# Patient Record
Sex: MALE | Race: ASIAN | NOT HISPANIC OR LATINO | Employment: FULL TIME | URBAN - METROPOLITAN AREA
[De-identification: names, ages, dates, MRNs, and addresses within clinical notes are randomized per-mention and may not be internally consistent; named-entity substitution may affect disease eponyms.]

---

## 2017-01-12 ENCOUNTER — COMMUNICATION - HEALTHEAST (OUTPATIENT)
Dept: FAMILY MEDICINE | Facility: CLINIC | Age: 42
End: 2017-01-12

## 2017-01-12 DIAGNOSIS — E11.9 DIABETES MELLITUS (H): ICD-10-CM

## 2017-03-14 ENCOUNTER — OFFICE VISIT - HEALTHEAST (OUTPATIENT)
Dept: FAMILY MEDICINE | Facility: CLINIC | Age: 42
End: 2017-03-14

## 2017-03-14 DIAGNOSIS — R05.9 COUGH: ICD-10-CM

## 2017-03-14 DIAGNOSIS — J98.01 BRONCHOSPASM: ICD-10-CM

## 2017-03-14 DIAGNOSIS — J40 BRONCHITIS: ICD-10-CM

## 2017-03-14 ASSESSMENT — MIFFLIN-ST. JEOR: SCORE: 1826.13

## 2017-04-06 ENCOUNTER — COMMUNICATION - HEALTHEAST (OUTPATIENT)
Dept: FAMILY MEDICINE | Facility: CLINIC | Age: 42
End: 2017-04-06

## 2017-04-06 DIAGNOSIS — E11.9 DIABETES (H): ICD-10-CM

## 2017-05-17 ENCOUNTER — OFFICE VISIT - HEALTHEAST (OUTPATIENT)
Dept: FAMILY MEDICINE | Facility: CLINIC | Age: 42
End: 2017-05-17

## 2017-05-17 DIAGNOSIS — K11.20 PAROTITIS: ICD-10-CM

## 2017-05-17 ASSESSMENT — MIFFLIN-ST. JEOR: SCORE: 1830.66

## 2017-10-03 ENCOUNTER — OFFICE VISIT - HEALTHEAST (OUTPATIENT)
Dept: FAMILY MEDICINE | Facility: CLINIC | Age: 42
End: 2017-10-03

## 2017-10-03 DIAGNOSIS — L30.9 DERMATITIS: ICD-10-CM

## 2017-10-03 DIAGNOSIS — E11.9 DIABETES (H): ICD-10-CM

## 2017-10-03 DIAGNOSIS — Z23 NEED FOR IMMUNIZATION AGAINST INFLUENZA: ICD-10-CM

## 2017-10-03 LAB
CHOLEST SERPL-MCNC: 154 MG/DL
FASTING STATUS PATIENT QL REPORTED: NO
HBA1C MFR BLD: 9.2 % (ref 3.5–6)
HDLC SERPL-MCNC: 32 MG/DL
LDLC SERPL CALC-MCNC: 88 MG/DL
TRIGL SERPL-MCNC: 172 MG/DL

## 2017-10-03 ASSESSMENT — MIFFLIN-ST. JEOR: SCORE: 1835.2

## 2017-10-04 ENCOUNTER — COMMUNICATION - HEALTHEAST (OUTPATIENT)
Dept: FAMILY MEDICINE | Facility: CLINIC | Age: 42
End: 2017-10-04

## 2017-10-08 ENCOUNTER — COMMUNICATION - HEALTHEAST (OUTPATIENT)
Dept: FAMILY MEDICINE | Facility: CLINIC | Age: 42
End: 2017-10-08

## 2017-10-08 DIAGNOSIS — E11.9 DIABETES (H): ICD-10-CM

## 2017-10-13 ENCOUNTER — RECORDS - HEALTHEAST (OUTPATIENT)
Dept: ADMINISTRATIVE | Facility: OTHER | Age: 42
End: 2017-10-13

## 2017-11-10 ENCOUNTER — RECORDS - HEALTHEAST (OUTPATIENT)
Dept: ADMINISTRATIVE | Facility: OTHER | Age: 42
End: 2017-11-10

## 2017-12-08 ENCOUNTER — RECORDS - HEALTHEAST (OUTPATIENT)
Dept: ADMINISTRATIVE | Facility: OTHER | Age: 42
End: 2017-12-08

## 2017-12-28 ENCOUNTER — COMMUNICATION - HEALTHEAST (OUTPATIENT)
Dept: FAMILY MEDICINE | Facility: CLINIC | Age: 42
End: 2017-12-28

## 2017-12-28 ENCOUNTER — OFFICE VISIT - HEALTHEAST (OUTPATIENT)
Dept: FAMILY MEDICINE | Facility: CLINIC | Age: 42
End: 2017-12-28

## 2017-12-28 DIAGNOSIS — R22.0 JAW SWELLING: ICD-10-CM

## 2017-12-28 DIAGNOSIS — E11.9 DIABETES MELLITUS (H): ICD-10-CM

## 2017-12-28 DIAGNOSIS — K11.20 PAROTIDITIS: ICD-10-CM

## 2017-12-28 DIAGNOSIS — E11.9 DIABETES (H): ICD-10-CM

## 2017-12-28 ASSESSMENT — MIFFLIN-ST. JEOR: SCORE: 1862.42

## 2018-02-05 ENCOUNTER — OFFICE VISIT - HEALTHEAST (OUTPATIENT)
Dept: FAMILY MEDICINE | Facility: CLINIC | Age: 43
End: 2018-02-05

## 2018-02-05 DIAGNOSIS — E11.9 DIABETES (H): ICD-10-CM

## 2018-02-05 LAB — HBA1C MFR BLD: 9.6 % (ref 3.5–6)

## 2018-02-05 ASSESSMENT — MIFFLIN-ST. JEOR: SCORE: 1835.2

## 2018-02-21 ENCOUNTER — COMMUNICATION - HEALTHEAST (OUTPATIENT)
Dept: FAMILY MEDICINE | Facility: CLINIC | Age: 43
End: 2018-02-21

## 2018-03-21 ENCOUNTER — OFFICE VISIT - HEALTHEAST (OUTPATIENT)
Dept: NURSING | Facility: CLINIC | Age: 43
End: 2018-03-21

## 2018-03-21 DIAGNOSIS — E11.9 TYPE 2 DIABETES MELLITUS WITHOUT COMPLICATION, WITHOUT LONG-TERM CURRENT USE OF INSULIN (H): ICD-10-CM

## 2018-03-23 ENCOUNTER — COMMUNICATION - HEALTHEAST (OUTPATIENT)
Dept: NURSING | Facility: CLINIC | Age: 43
End: 2018-03-23

## 2018-03-23 DIAGNOSIS — E11.9 TYPE 2 DIABETES MELLITUS WITHOUT COMPLICATION, WITHOUT LONG-TERM CURRENT USE OF INSULIN (H): ICD-10-CM

## 2018-04-02 ENCOUNTER — COMMUNICATION - HEALTHEAST (OUTPATIENT)
Dept: FAMILY MEDICINE | Facility: CLINIC | Age: 43
End: 2018-04-02

## 2018-04-02 DIAGNOSIS — E11.9 DIABETES MELLITUS (H): ICD-10-CM

## 2018-05-17 ENCOUNTER — COMMUNICATION - HEALTHEAST (OUTPATIENT)
Dept: NURSING | Facility: CLINIC | Age: 43
End: 2018-05-17

## 2018-05-17 DIAGNOSIS — E11.9 TYPE 2 DIABETES MELLITUS WITHOUT COMPLICATION, WITHOUT LONG-TERM CURRENT USE OF INSULIN (H): ICD-10-CM

## 2018-05-18 ENCOUNTER — COMMUNICATION - HEALTHEAST (OUTPATIENT)
Dept: NURSING | Facility: CLINIC | Age: 43
End: 2018-05-18

## 2018-05-18 DIAGNOSIS — E11.9 TYPE 2 DIABETES MELLITUS WITHOUT COMPLICATION, WITHOUT LONG-TERM CURRENT USE OF INSULIN (H): ICD-10-CM

## 2018-05-21 ENCOUNTER — OFFICE VISIT - HEALTHEAST (OUTPATIENT)
Dept: PHARMACY | Facility: CLINIC | Age: 43
End: 2018-05-21

## 2018-05-21 DIAGNOSIS — E11.9 TYPE 2 DIABETES MELLITUS WITHOUT COMPLICATION, WITHOUT LONG-TERM CURRENT USE OF INSULIN (H): ICD-10-CM

## 2018-06-11 ENCOUNTER — OFFICE VISIT - HEALTHEAST (OUTPATIENT)
Dept: PHARMACY | Facility: CLINIC | Age: 43
End: 2018-06-11

## 2018-06-11 DIAGNOSIS — E11.9 TYPE 2 DIABETES MELLITUS WITHOUT COMPLICATION, WITHOUT LONG-TERM CURRENT USE OF INSULIN (H): ICD-10-CM

## 2018-06-16 ENCOUNTER — COMMUNICATION - HEALTHEAST (OUTPATIENT)
Dept: NURSING | Facility: CLINIC | Age: 43
End: 2018-06-16

## 2018-06-16 DIAGNOSIS — E11.9 TYPE 2 DIABETES MELLITUS WITHOUT COMPLICATION, WITHOUT LONG-TERM CURRENT USE OF INSULIN (H): ICD-10-CM

## 2018-07-15 ENCOUNTER — COMMUNICATION - HEALTHEAST (OUTPATIENT)
Dept: NURSING | Facility: CLINIC | Age: 43
End: 2018-07-15

## 2018-07-15 DIAGNOSIS — E11.9 TYPE 2 DIABETES MELLITUS WITHOUT COMPLICATION, WITHOUT LONG-TERM CURRENT USE OF INSULIN (H): ICD-10-CM

## 2018-07-23 ENCOUNTER — OFFICE VISIT - HEALTHEAST (OUTPATIENT)
Dept: PHARMACY | Facility: CLINIC | Age: 43
End: 2018-07-23

## 2018-07-23 DIAGNOSIS — E78.5 DYSLIPIDEMIA: ICD-10-CM

## 2018-07-23 DIAGNOSIS — E11.9 TYPE 2 DIABETES MELLITUS WITHOUT COMPLICATION, WITHOUT LONG-TERM CURRENT USE OF INSULIN (H): ICD-10-CM

## 2018-07-31 ENCOUNTER — RECORDS - HEALTHEAST (OUTPATIENT)
Dept: ADMINISTRATIVE | Facility: OTHER | Age: 43
End: 2018-07-31

## 2018-08-17 ENCOUNTER — COMMUNICATION - HEALTHEAST (OUTPATIENT)
Dept: NURSING | Facility: CLINIC | Age: 43
End: 2018-08-17

## 2018-08-17 DIAGNOSIS — E11.9 TYPE 2 DIABETES MELLITUS WITHOUT COMPLICATION, WITHOUT LONG-TERM CURRENT USE OF INSULIN (H): ICD-10-CM

## 2018-08-27 ENCOUNTER — OFFICE VISIT - HEALTHEAST (OUTPATIENT)
Dept: FAMILY MEDICINE | Facility: CLINIC | Age: 43
End: 2018-08-27

## 2018-08-27 DIAGNOSIS — L30.9 DERMATITIS: ICD-10-CM

## 2018-08-27 DIAGNOSIS — K11.20 PAROTIDITIS: ICD-10-CM

## 2018-08-27 DIAGNOSIS — E11.9 TYPE 2 DIABETES MELLITUS WITHOUT COMPLICATION, WITHOUT LONG-TERM CURRENT USE OF INSULIN (H): ICD-10-CM

## 2018-08-27 ASSESSMENT — MIFFLIN-ST. JEOR: SCORE: 1871.49

## 2018-09-16 ENCOUNTER — COMMUNICATION - HEALTHEAST (OUTPATIENT)
Dept: NURSING | Facility: CLINIC | Age: 43
End: 2018-09-16

## 2018-09-16 DIAGNOSIS — E11.9 TYPE 2 DIABETES MELLITUS WITHOUT COMPLICATION, WITHOUT LONG-TERM CURRENT USE OF INSULIN (H): ICD-10-CM

## 2018-10-16 ENCOUNTER — COMMUNICATION - HEALTHEAST (OUTPATIENT)
Dept: NURSING | Facility: CLINIC | Age: 43
End: 2018-10-16

## 2018-10-16 DIAGNOSIS — E11.9 TYPE 2 DIABETES MELLITUS WITHOUT COMPLICATION, WITHOUT LONG-TERM CURRENT USE OF INSULIN (H): ICD-10-CM

## 2018-10-26 ENCOUNTER — COMMUNICATION - HEALTHEAST (OUTPATIENT)
Dept: PHARMACY | Facility: CLINIC | Age: 43
End: 2018-10-26

## 2018-10-26 ENCOUNTER — OFFICE VISIT - HEALTHEAST (OUTPATIENT)
Dept: PHARMACY | Facility: CLINIC | Age: 43
End: 2018-10-26

## 2018-10-26 DIAGNOSIS — E78.5 DYSLIPIDEMIA: ICD-10-CM

## 2018-10-26 DIAGNOSIS — E11.8 TYPE 2 DIABETES MELLITUS WITH COMPLICATION, WITHOUT LONG-TERM CURRENT USE OF INSULIN (H): ICD-10-CM

## 2018-10-26 LAB — HBA1C MFR BLD: 6.6 % (ref 3.5–6)

## 2018-11-18 ENCOUNTER — COMMUNICATION - HEALTHEAST (OUTPATIENT)
Dept: NURSING | Facility: CLINIC | Age: 43
End: 2018-11-18

## 2018-11-18 DIAGNOSIS — E11.9 TYPE 2 DIABETES MELLITUS WITHOUT COMPLICATION, WITHOUT LONG-TERM CURRENT USE OF INSULIN (H): ICD-10-CM

## 2018-12-21 ENCOUNTER — COMMUNICATION - HEALTHEAST (OUTPATIENT)
Dept: NURSING | Facility: CLINIC | Age: 43
End: 2018-12-21

## 2018-12-21 DIAGNOSIS — E11.9 TYPE 2 DIABETES MELLITUS WITHOUT COMPLICATION, WITHOUT LONG-TERM CURRENT USE OF INSULIN (H): ICD-10-CM

## 2019-01-08 ENCOUNTER — COMMUNICATION - HEALTHEAST (OUTPATIENT)
Dept: NURSING | Facility: CLINIC | Age: 44
End: 2019-01-08

## 2019-01-08 DIAGNOSIS — E11.9 TYPE 2 DIABETES MELLITUS WITHOUT COMPLICATION, WITHOUT LONG-TERM CURRENT USE OF INSULIN (H): ICD-10-CM

## 2019-01-16 ENCOUNTER — OFFICE VISIT - HEALTHEAST (OUTPATIENT)
Dept: PHARMACY | Facility: CLINIC | Age: 44
End: 2019-01-16

## 2019-01-16 ENCOUNTER — AMBULATORY - HEALTHEAST (OUTPATIENT)
Dept: LAB | Facility: CLINIC | Age: 44
End: 2019-01-16

## 2019-01-16 DIAGNOSIS — R07.0 THROAT PAIN: ICD-10-CM

## 2019-01-16 DIAGNOSIS — E11.9 TYPE 2 DIABETES MELLITUS WITHOUT COMPLICATION, WITHOUT LONG-TERM CURRENT USE OF INSULIN (H): ICD-10-CM

## 2019-01-16 DIAGNOSIS — E78.5 DYSLIPIDEMIA: ICD-10-CM

## 2019-01-16 LAB — HBA1C MFR BLD: 7.6 % (ref 3.5–6)

## 2019-01-31 ENCOUNTER — COMMUNICATION - HEALTHEAST (OUTPATIENT)
Dept: PHARMACY | Facility: CLINIC | Age: 44
End: 2019-01-31

## 2019-01-31 DIAGNOSIS — E78.5 DYSLIPIDEMIA: ICD-10-CM

## 2019-01-31 DIAGNOSIS — E11.9 TYPE 2 DIABETES MELLITUS WITHOUT COMPLICATION, WITHOUT LONG-TERM CURRENT USE OF INSULIN (H): ICD-10-CM

## 2019-02-21 ENCOUNTER — RECORDS - HEALTHEAST (OUTPATIENT)
Dept: ADMINISTRATIVE | Facility: OTHER | Age: 44
End: 2019-02-21

## 2019-03-07 ENCOUNTER — COMMUNICATION - HEALTHEAST (OUTPATIENT)
Dept: PHARMACY | Facility: CLINIC | Age: 44
End: 2019-03-07

## 2019-03-07 ENCOUNTER — OFFICE VISIT - HEALTHEAST (OUTPATIENT)
Dept: PHARMACY | Facility: CLINIC | Age: 44
End: 2019-03-07

## 2019-03-07 ENCOUNTER — AMBULATORY - HEALTHEAST (OUTPATIENT)
Dept: LAB | Facility: CLINIC | Age: 44
End: 2019-03-07

## 2019-03-07 DIAGNOSIS — E78.5 DYSLIPIDEMIA: ICD-10-CM

## 2019-03-07 DIAGNOSIS — E11.9 TYPE 2 DIABETES MELLITUS WITHOUT COMPLICATION, WITHOUT LONG-TERM CURRENT USE OF INSULIN (H): ICD-10-CM

## 2019-03-07 LAB
ANION GAP SERPL CALCULATED.3IONS-SCNC: 9 MMOL/L (ref 5–18)
BUN SERPL-MCNC: 9 MG/DL (ref 8–22)
CALCIUM SERPL-MCNC: 9.3 MG/DL (ref 8.5–10.5)
CHLORIDE BLD-SCNC: 105 MMOL/L (ref 98–107)
CHOLEST SERPL-MCNC: 93 MG/DL
CO2 SERPL-SCNC: 28 MMOL/L (ref 22–31)
CREAT SERPL-MCNC: 0.91 MG/DL (ref 0.7–1.3)
CREAT UR-MCNC: 115.7 MG/DL
FASTING STATUS PATIENT QL REPORTED: YES
GFR SERPL CREATININE-BSD FRML MDRD: >60 ML/MIN/1.73M2
GLUCOSE BLD-MCNC: 146 MG/DL (ref 70–125)
HDLC SERPL-MCNC: 27 MG/DL
LDLC SERPL CALC-MCNC: 42 MG/DL
MICROALBUMIN UR-MCNC: 4.89 MG/DL (ref 0–1.99)
MICROALBUMIN/CREAT UR: 42.3 MG/G
POTASSIUM BLD-SCNC: 3.9 MMOL/L (ref 3.5–5)
SODIUM SERPL-SCNC: 142 MMOL/L (ref 136–145)
TRIGL SERPL-MCNC: 122 MG/DL

## 2019-03-14 ENCOUNTER — OFFICE VISIT - HEALTHEAST (OUTPATIENT)
Dept: FAMILY MEDICINE | Facility: CLINIC | Age: 44
End: 2019-03-14

## 2019-03-14 DIAGNOSIS — J45.901 MODERATE ASTHMA WITH EXACERBATION, UNSPECIFIED WHETHER PERSISTENT: ICD-10-CM

## 2019-03-21 ENCOUNTER — COMMUNICATION - HEALTHEAST (OUTPATIENT)
Dept: FAMILY MEDICINE | Facility: CLINIC | Age: 44
End: 2019-03-21

## 2019-03-21 ENCOUNTER — OFFICE VISIT - HEALTHEAST (OUTPATIENT)
Dept: FAMILY MEDICINE | Facility: CLINIC | Age: 44
End: 2019-03-21

## 2019-03-21 DIAGNOSIS — R09.82 POST-NASAL DRIP: ICD-10-CM

## 2019-03-21 DIAGNOSIS — E11.9 TYPE 2 DIABETES MELLITUS WITHOUT COMPLICATION, WITHOUT LONG-TERM CURRENT USE OF INSULIN (H): ICD-10-CM

## 2019-03-21 DIAGNOSIS — R05.9 COUGH: ICD-10-CM

## 2019-03-21 ASSESSMENT — MIFFLIN-ST. JEOR: SCORE: 1871.49

## 2019-04-05 ENCOUNTER — COMMUNICATION - HEALTHEAST (OUTPATIENT)
Dept: FAMILY MEDICINE | Facility: CLINIC | Age: 44
End: 2019-04-05

## 2019-04-05 DIAGNOSIS — E11.9 DIABETES MELLITUS (H): ICD-10-CM

## 2019-04-15 ENCOUNTER — OFFICE VISIT - HEALTHEAST (OUTPATIENT)
Dept: FAMILY MEDICINE | Facility: CLINIC | Age: 44
End: 2019-04-15

## 2019-04-15 DIAGNOSIS — R03.0 ELEVATED BLOOD PRESSURE READING WITHOUT DIAGNOSIS OF HYPERTENSION: ICD-10-CM

## 2019-04-15 DIAGNOSIS — R05.9 COUGH: ICD-10-CM

## 2019-04-15 ASSESSMENT — MIFFLIN-ST. JEOR: SCORE: 1872.62

## 2019-05-13 ENCOUNTER — COMMUNICATION - HEALTHEAST (OUTPATIENT)
Dept: FAMILY MEDICINE | Facility: CLINIC | Age: 44
End: 2019-05-13

## 2019-05-13 ENCOUNTER — AMBULATORY - HEALTHEAST (OUTPATIENT)
Dept: FAMILY MEDICINE | Facility: CLINIC | Age: 44
End: 2019-05-13

## 2019-05-13 DIAGNOSIS — E11.9 DIABETES MELLITUS (H): ICD-10-CM

## 2019-06-03 ENCOUNTER — COMMUNICATION - HEALTHEAST (OUTPATIENT)
Dept: FAMILY MEDICINE | Facility: CLINIC | Age: 44
End: 2019-06-03

## 2019-06-03 DIAGNOSIS — E11.9 TYPE 2 DIABETES MELLITUS WITHOUT COMPLICATION, WITHOUT LONG-TERM CURRENT USE OF INSULIN (H): ICD-10-CM

## 2019-06-11 ENCOUNTER — AMBULATORY - HEALTHEAST (OUTPATIENT)
Dept: FAMILY MEDICINE | Facility: CLINIC | Age: 44
End: 2019-06-11

## 2019-06-11 ENCOUNTER — OFFICE VISIT - HEALTHEAST (OUTPATIENT)
Dept: FAMILY MEDICINE | Facility: CLINIC | Age: 44
End: 2019-06-11

## 2019-06-11 DIAGNOSIS — E11.9 TYPE 2 DIABETES MELLITUS WITHOUT COMPLICATION, WITHOUT LONG-TERM CURRENT USE OF INSULIN (H): ICD-10-CM

## 2019-06-11 DIAGNOSIS — L30.9 DERMATITIS: ICD-10-CM

## 2019-06-11 DIAGNOSIS — L28.0 LICHEN SIMPLEX CHRONICUS: ICD-10-CM

## 2019-06-11 LAB — HBA1C MFR BLD: 6.5 % (ref 3.5–6)

## 2019-06-11 ASSESSMENT — MIFFLIN-ST. JEOR: SCORE: 1848.64

## 2019-07-05 ENCOUNTER — COMMUNICATION - HEALTHEAST (OUTPATIENT)
Dept: FAMILY MEDICINE | Facility: CLINIC | Age: 44
End: 2019-07-05

## 2019-07-05 DIAGNOSIS — E11.9 TYPE 2 DIABETES MELLITUS WITHOUT COMPLICATION, WITHOUT LONG-TERM CURRENT USE OF INSULIN (H): ICD-10-CM

## 2019-07-08 ENCOUNTER — RECORDS - HEALTHEAST (OUTPATIENT)
Dept: HEALTH INFORMATION MANAGEMENT | Facility: CLINIC | Age: 44
End: 2019-07-08

## 2019-07-17 ENCOUNTER — COMMUNICATION - HEALTHEAST (OUTPATIENT)
Dept: FAMILY MEDICINE | Facility: CLINIC | Age: 44
End: 2019-07-17

## 2019-07-17 DIAGNOSIS — E78.5 DYSLIPIDEMIA: ICD-10-CM

## 2019-07-17 DIAGNOSIS — E11.9 TYPE 2 DIABETES MELLITUS WITHOUT COMPLICATION, WITHOUT LONG-TERM CURRENT USE OF INSULIN (H): ICD-10-CM

## 2019-07-19 ENCOUNTER — COMMUNICATION - HEALTHEAST (OUTPATIENT)
Dept: FAMILY MEDICINE | Facility: CLINIC | Age: 44
End: 2019-07-19

## 2019-07-19 DIAGNOSIS — E11.9 DIABETES MELLITUS (H): ICD-10-CM

## 2019-09-29 ENCOUNTER — COMMUNICATION - HEALTHEAST (OUTPATIENT)
Dept: FAMILY MEDICINE | Facility: CLINIC | Age: 44
End: 2019-09-29

## 2019-09-29 DIAGNOSIS — R09.82 POST-NASAL DRIP: ICD-10-CM

## 2019-09-29 DIAGNOSIS — R05.9 COUGH: ICD-10-CM

## 2019-09-29 DIAGNOSIS — J45.901 MODERATE ASTHMA WITH EXACERBATION, UNSPECIFIED WHETHER PERSISTENT: ICD-10-CM

## 2019-10-11 ENCOUNTER — OFFICE VISIT - HEALTHEAST (OUTPATIENT)
Dept: FAMILY MEDICINE | Facility: CLINIC | Age: 44
End: 2019-10-11

## 2019-10-11 DIAGNOSIS — L28.0 LICHEN SIMPLEX CHRONICUS: ICD-10-CM

## 2019-10-11 DIAGNOSIS — E11.9 TYPE 2 DIABETES MELLITUS WITHOUT COMPLICATION, WITHOUT LONG-TERM CURRENT USE OF INSULIN (H): ICD-10-CM

## 2019-10-11 LAB — HBA1C MFR BLD: 5.6 % (ref 3.5–6)

## 2019-10-11 ASSESSMENT — MIFFLIN-ST. JEOR: SCORE: 1854.9

## 2019-12-28 ENCOUNTER — COMMUNICATION - HEALTHEAST (OUTPATIENT)
Dept: PHARMACY | Facility: CLINIC | Age: 44
End: 2019-12-28

## 2019-12-28 DIAGNOSIS — E11.9 TYPE 2 DIABETES MELLITUS WITHOUT COMPLICATION, WITHOUT LONG-TERM CURRENT USE OF INSULIN (H): ICD-10-CM

## 2020-02-11 ENCOUNTER — OFFICE VISIT - HEALTHEAST (OUTPATIENT)
Dept: FAMILY MEDICINE | Facility: CLINIC | Age: 45
End: 2020-02-11

## 2020-02-11 DIAGNOSIS — E78.5 DYSLIPIDEMIA: ICD-10-CM

## 2020-02-11 DIAGNOSIS — E11.9 TYPE 2 DIABETES MELLITUS WITHOUT COMPLICATION, WITHOUT LONG-TERM CURRENT USE OF INSULIN (H): ICD-10-CM

## 2020-02-11 LAB
ALBUMIN SERPL-MCNC: 4.3 G/DL (ref 3.5–5)
ALP SERPL-CCNC: 83 U/L (ref 45–120)
ALT SERPL W P-5'-P-CCNC: 49 U/L (ref 0–45)
ANION GAP SERPL CALCULATED.3IONS-SCNC: 8 MMOL/L (ref 5–18)
AST SERPL W P-5'-P-CCNC: 23 U/L (ref 0–40)
BILIRUB SERPL-MCNC: 0.4 MG/DL (ref 0–1)
BUN SERPL-MCNC: 17 MG/DL (ref 8–22)
CALCIUM SERPL-MCNC: 9.6 MG/DL (ref 8.5–10.5)
CHLORIDE BLD-SCNC: 105 MMOL/L (ref 98–107)
CHOLEST SERPL-MCNC: 170 MG/DL
CO2 SERPL-SCNC: 27 MMOL/L (ref 22–31)
CREAT SERPL-MCNC: 0.94 MG/DL (ref 0.7–1.3)
CREAT UR-MCNC: 107.2 MG/DL
FASTING STATUS PATIENT QL REPORTED: YES
GFR SERPL CREATININE-BSD FRML MDRD: >60 ML/MIN/1.73M2
GLUCOSE BLD-MCNC: 185 MG/DL (ref 70–125)
HBA1C MFR BLD: 6.7 % (ref 3.5–6)
HDLC SERPL-MCNC: 31 MG/DL
LDLC SERPL CALC-MCNC: 112 MG/DL
MICROALBUMIN UR-MCNC: 9.85 MG/DL (ref 0–1.99)
MICROALBUMIN/CREAT UR: 91.9 MG/G
POTASSIUM BLD-SCNC: 4.2 MMOL/L (ref 3.5–5)
PROT SERPL-MCNC: 7.6 G/DL (ref 6–8)
SODIUM SERPL-SCNC: 140 MMOL/L (ref 136–145)
TRIGL SERPL-MCNC: 134 MG/DL

## 2020-02-11 ASSESSMENT — MIFFLIN-ST. JEOR: SCORE: 1867.84

## 2020-02-15 ENCOUNTER — COMMUNICATION - HEALTHEAST (OUTPATIENT)
Dept: FAMILY MEDICINE | Facility: CLINIC | Age: 45
End: 2020-02-15

## 2020-02-26 ENCOUNTER — COMMUNICATION - HEALTHEAST (OUTPATIENT)
Dept: FAMILY MEDICINE | Facility: CLINIC | Age: 45
End: 2020-02-26

## 2020-02-26 DIAGNOSIS — E11.9 TYPE 2 DIABETES MELLITUS WITHOUT COMPLICATION, WITHOUT LONG-TERM CURRENT USE OF INSULIN (H): ICD-10-CM

## 2020-03-11 ENCOUNTER — COMMUNICATION - HEALTHEAST (OUTPATIENT)
Dept: FAMILY MEDICINE | Facility: CLINIC | Age: 45
End: 2020-03-11

## 2020-03-11 DIAGNOSIS — E11.9 TYPE 2 DIABETES MELLITUS WITHOUT COMPLICATION, WITHOUT LONG-TERM CURRENT USE OF INSULIN (H): ICD-10-CM

## 2020-03-13 ENCOUNTER — COMMUNICATION - HEALTHEAST (OUTPATIENT)
Dept: FAMILY MEDICINE | Facility: CLINIC | Age: 45
End: 2020-03-13

## 2020-03-17 ENCOUNTER — RECORDS - HEALTHEAST (OUTPATIENT)
Dept: FAMILY MEDICINE | Facility: CLINIC | Age: 45
End: 2020-03-17

## 2020-03-17 ENCOUNTER — COMMUNICATION - HEALTHEAST (OUTPATIENT)
Dept: FAMILY MEDICINE | Facility: CLINIC | Age: 45
End: 2020-03-17

## 2020-03-17 DIAGNOSIS — E11.9 TYPE 2 DIABETES MELLITUS WITHOUT COMPLICATION, WITHOUT LONG-TERM CURRENT USE OF INSULIN (H): ICD-10-CM

## 2020-03-18 ENCOUNTER — COMMUNICATION - HEALTHEAST (OUTPATIENT)
Dept: FAMILY MEDICINE | Facility: CLINIC | Age: 45
End: 2020-03-18

## 2020-03-23 ENCOUNTER — AMBULATORY - HEALTHEAST (OUTPATIENT)
Dept: FAMILY MEDICINE | Facility: CLINIC | Age: 45
End: 2020-03-23

## 2020-03-23 DIAGNOSIS — E11.9 TYPE 2 DIABETES MELLITUS WITHOUT COMPLICATION, WITHOUT LONG-TERM CURRENT USE OF INSULIN (H): ICD-10-CM

## 2020-05-15 ENCOUNTER — VIRTUAL VISIT (OUTPATIENT)
Dept: FAMILY MEDICINE | Facility: OTHER | Age: 45
End: 2020-05-15

## 2020-05-15 NOTE — PROGRESS NOTES
"Date: 05/15/2020 10:49:49  Clinician: Caroline Ospina  Clinician NPI: [id number]  Patient: Kaveh Peterson  Patient : 1975  [place] [address]  Patient Phone: [phone]  Visit Protocol: URI  Patient Summary:  Kaveh Miller is a 44 year old ( : 1975 ) male who initiated a Visit for COVID-19 (Coronavirus) evaluation and screening. When asked the question \"Please sign me up to receive news, health information and promotions from [employer].\", Kaveh Miller responded \"No\".    Kaveh Miller states his symptoms started gradually 3-4 days ago.   His symptoms consist of a cough, chills, malaise, myalgia, and a sore throat.   Symptom details     Cough: Kaveh Miller coughs a few times an hour and his cough is not more bothersome at night. Phlegm does not come into his throat when he coughs. He does not believe his cough is caused by post-nasal drip.     Sore throat: Kaveh Miller reports having mild throat pain (1-3 on a 10 point pain scale), does not have exudate on his tonsils, and can swallow liquids. The lymph nodes in his neck are not enlarged. A rash has not appeared on the skin since the sore throat started.      Kaveh Miller denies having nasal congestion, vomiting, nausea, teeth pain, ageusia, diarrhea, facial pain or pressure, ear pain, headache, rhinitis, anosmia, wheezing, enlarged lymph nodes, and fever. He also denies double sickening (worsening symptoms after initial improvement), taking antibiotic medication for the symptoms, and having recent facial or sinus surgery in the past 60 days. He is not experiencing dyspnea.   Precipitating events  Within the past week, Kaveh Miller has not been exposed to someone with strep throat. He has not recently been exposed to someone with influenza. Kaveh Miller has not been in close contact with any high risk individuals.   Pertinent COVID-19 (Coronavirus) information  In the past 14 days, Kaveh Miller has not worked " in a congregate living setting.   He does not work or volunteer as healthcare worker or a  and does not work or volunteer in a healthcare facility.   Kaveh Miller also has not lived in a congregate living setting in the past 14 days. He lives with a healthcare worker.   Kaveh Miller has not had a close contact with a laboratory-confirmed COVID-19 patient within 14 days of symptom onset.   Pertinent medical history  Kaveh Miller needs a return to work/school note.   Weight: 230 lbs   Kaveh Miller does not smoke or use smokeless tobacco.   Weight: 230 lbs    MEDICATIONS: atorvastatin oral, glipizide oral, metformin oral, ALLERGIES: NKDA  Clinician Response:  Dear Saw Angela,   Dear Saw Angela  Your symptoms show that you may have coronavirus (COVID-19). This illness can cause fever, cough and trouble breathing. Many people get a mild case and get better on their own. Some people can get very sick.  What should I do?  We would like to test you for this virus. This will be a curbside test done outside the clinic.  Please call [phone] to schedule your visit. Explain that you were referred by [facility] to have a COVID-19 test. Be ready to share your [facility] visit ID number.  Starting now:  Stay at least 6 feet away from others. (If someone will drive you to your test, stay in the backseat, as far away from the  as you can.)   Don't go to work, school or anywhere else. When it's time for your test, go straight to the testing site. Don't make any stops on the way there or back.   Wash your hands and face often. Use soap and water.   Cover your mouth and nose with a mask, tissue or washcloth.   Don't touch anyone. No hugging, kissing or handshakes.  While at home   Stay home and away from others (self-isolate) until:  You've had no fever---and no medicine that reduces fever---for 3 full days (72 hours). And...  Your other symptoms have gotten better. For example, your cough or breathing has  "improved. And...  At least 10 days have passed since your symptoms started.  During this time:  Stay in your own room (and use your own bathroom), if you can.  Don't go to work, school or anywhere else.  Stay away from others in your home. No hugging, kissing or shaking hands.  Don't let anyone visit.  Cover your mouth and nose with a mask, tissue or washcloth to avoid spreading germs.  Clean \"high touch\" surfaces often (doorknobs, counters, handles, etc.). Use a household cleaning spray or wipes.  Wash your hands and face often. Use soap and water.  How can I take care of myself?  1. Get lots of rest. Drink extra fluids (unless your doctor has told you not to).  2. Take Tylenol (acetaminophen) for fever or pain. If you have liver or kidney problems, ask your family doctor if it's okay to take Tylenol.  Adults can take either:   650 mg (two 325 mg pills) every 4 to 6 hours, or...  1,000 mg (two 500 mg pills) every 8 hours as needed.   Note: Don't take more than 3,000 mg in one day.   Acetaminophen is found in many medicines (both prescribed and over-the-counter medicines). Read all labels to be sure you don't take too much.   For children, check the Tylenol bottle for the right dose. The dose is based on the child's age or weight.  3. If you have other health problems (like cancer, heart failure, an organ transplant or severe kidney disease): Call your specialty clinic if you don't feel better in the next 2 days.  4. Know when to call 911: If your breathing is so bad that it keeps you from doing normal activities, call 911 or go to the emergency room. Tell them that you've been staying home and may have COVID-19.  5. Sign up for [employer]. We know it's scary to hear that you might have COVID-19. We want to track your symptoms to make sure you're okay over the next 2 weeks. Please look for an email from [employer]---this is a free, online program that we'll use to keep in touch. To sign up, follow the link in " the email. Learn more at [email or web address].  6. The following will serve as your written order for this Covid Test ordered by me for the indication of suspected Covid [Z20.828]: The test will be ordered in [facility], our electronic health record after you are scheduled and will show as ordered and authorized by Foster Caldwell MD   Order: Covid-19 (Coronavirus) PCR for SYMPTOMATIC testing from [facility]  Where can I get more information?  To learn more about COVID-19 and how to care for yourself at home, please visit the CDC website at https://www.cdc.gov/coronavirus/2019-ncov/about/steps-when-sick.html.  For more about your care at [facility], please visit [email or web address]  If you'd like to be part of a COVID-19 clinical trial (research study) at the [facility], go to [email or web address] for details.    Diagnosis: Acute upper respiratory infection, unspecified  Diagnosis ICD: J06.9

## 2020-05-18 ENCOUNTER — AMBULATORY - HEALTHEAST (OUTPATIENT)
Dept: FAMILY MEDICINE | Facility: CLINIC | Age: 45
End: 2020-05-18

## 2020-05-18 DIAGNOSIS — Z20.822 SUSPECTED COVID-19 VIRUS INFECTION: ICD-10-CM

## 2020-05-20 ENCOUNTER — AMBULATORY - HEALTHEAST (OUTPATIENT)
Dept: CARE COORDINATION | Facility: CLINIC | Age: 45
End: 2020-05-20

## 2020-05-20 ENCOUNTER — COMMUNICATION - HEALTHEAST (OUTPATIENT)
Dept: NURSING | Facility: CLINIC | Age: 45
End: 2020-05-20

## 2020-05-20 DIAGNOSIS — U07.1 COVID-19: ICD-10-CM

## 2020-05-20 DIAGNOSIS — E11.9 TYPE 2 DIABETES MELLITUS WITHOUT COMPLICATION, WITHOUT LONG-TERM CURRENT USE OF INSULIN (H): ICD-10-CM

## 2020-05-21 ENCOUNTER — COMMUNICATION - HEALTHEAST (OUTPATIENT)
Dept: CARE COORDINATION | Facility: CLINIC | Age: 45
End: 2020-05-21

## 2020-05-22 ENCOUNTER — OFFICE VISIT - HEALTHEAST (OUTPATIENT)
Dept: FAMILY MEDICINE | Facility: CLINIC | Age: 45
End: 2020-05-22

## 2020-05-22 DIAGNOSIS — U07.1 COVID-19: ICD-10-CM

## 2020-05-22 DIAGNOSIS — R05.9 COUGH: ICD-10-CM

## 2020-05-26 ENCOUNTER — COMMUNICATION - HEALTHEAST (OUTPATIENT)
Dept: NURSING | Facility: CLINIC | Age: 45
End: 2020-05-26

## 2020-05-26 ENCOUNTER — COMMUNICATION - HEALTHEAST (OUTPATIENT)
Dept: FAMILY MEDICINE | Facility: CLINIC | Age: 45
End: 2020-05-26

## 2020-05-26 DIAGNOSIS — E11.9 TYPE 2 DIABETES MELLITUS WITHOUT COMPLICATION, WITHOUT LONG-TERM CURRENT USE OF INSULIN (H): ICD-10-CM

## 2020-06-09 ENCOUNTER — RECORDS - HEALTHEAST (OUTPATIENT)
Dept: ADMINISTRATIVE | Facility: OTHER | Age: 45
End: 2020-06-09

## 2020-06-09 ENCOUNTER — TRANSFERRED RECORDS (OUTPATIENT)
Dept: HEALTH INFORMATION MANAGEMENT | Facility: CLINIC | Age: 45
End: 2020-06-09

## 2020-06-09 ENCOUNTER — COMMUNICATION - HEALTHEAST (OUTPATIENT)
Dept: NURSING | Facility: CLINIC | Age: 45
End: 2020-06-09

## 2020-06-09 LAB
RETINOPATHY: POSITIVE
RETINOPATHY: POSITIVE

## 2020-06-09 ASSESSMENT — ACTIVITIES OF DAILY LIVING (ADL): DEPENDENT_IADLS:: INDEPENDENT

## 2020-06-25 ENCOUNTER — COMMUNICATION - HEALTHEAST (OUTPATIENT)
Dept: FAMILY MEDICINE | Facility: CLINIC | Age: 45
End: 2020-06-25

## 2020-06-25 DIAGNOSIS — E11.9 TYPE 2 DIABETES MELLITUS WITHOUT COMPLICATION, WITHOUT LONG-TERM CURRENT USE OF INSULIN (H): ICD-10-CM

## 2020-06-25 DIAGNOSIS — E78.5 DYSLIPIDEMIA: ICD-10-CM

## 2020-06-25 RX ORDER — ATORVASTATIN CALCIUM 20 MG/1
TABLET, FILM COATED ORAL
Qty: 90 TABLET | Refills: 3 | Status: SHIPPED | OUTPATIENT
Start: 2020-06-25 | End: 2021-09-17

## 2020-07-07 ENCOUNTER — COMMUNICATION - HEALTHEAST (OUTPATIENT)
Dept: FAMILY MEDICINE | Facility: CLINIC | Age: 45
End: 2020-07-07

## 2020-07-07 ENCOUNTER — OFFICE VISIT - HEALTHEAST (OUTPATIENT)
Dept: FAMILY MEDICINE | Facility: CLINIC | Age: 45
End: 2020-07-07

## 2020-07-07 DIAGNOSIS — E11.9 TYPE 2 DIABETES MELLITUS WITHOUT COMPLICATION, WITHOUT LONG-TERM CURRENT USE OF INSULIN (H): ICD-10-CM

## 2020-07-07 ASSESSMENT — MIFFLIN-ST. JEOR: SCORE: 1832.25

## 2020-07-10 ENCOUNTER — RECORDS - HEALTHEAST (OUTPATIENT)
Dept: HEALTH INFORMATION MANAGEMENT | Facility: CLINIC | Age: 45
End: 2020-07-10

## 2020-07-22 ENCOUNTER — COMMUNICATION - HEALTHEAST (OUTPATIENT)
Dept: FAMILY MEDICINE | Facility: CLINIC | Age: 45
End: 2020-07-22

## 2020-07-22 DIAGNOSIS — E11.9 TYPE 2 DIABETES MELLITUS WITHOUT COMPLICATION, WITHOUT LONG-TERM CURRENT USE OF INSULIN (H): ICD-10-CM

## 2020-08-26 ENCOUNTER — OFFICE VISIT - HEALTHEAST (OUTPATIENT)
Dept: FAMILY MEDICINE | Facility: CLINIC | Age: 45
End: 2020-08-26

## 2020-08-26 DIAGNOSIS — E78.2 MIXED HYPERLIPIDEMIA: ICD-10-CM

## 2020-08-26 DIAGNOSIS — E11.9 TYPE 2 DIABETES MELLITUS WITHOUT COMPLICATION, WITHOUT LONG-TERM CURRENT USE OF INSULIN (H): ICD-10-CM

## 2020-08-26 LAB
CHOLEST SERPL-MCNC: 90 MG/DL
FASTING STATUS PATIENT QL REPORTED: YES
HBA1C MFR BLD: 10.3 %
HDLC SERPL-MCNC: 27 MG/DL
LDLC SERPL CALC-MCNC: 41 MG/DL
TRIGL SERPL-MCNC: 109 MG/DL

## 2020-08-26 ASSESSMENT — MIFFLIN-ST. JEOR: SCORE: 1835.2

## 2020-09-30 DIAGNOSIS — Z11.59 SCREENING FOR VIRAL DISEASE: ICD-10-CM

## 2020-11-18 ENCOUNTER — COMMUNICATION - HEALTHEAST (OUTPATIENT)
Dept: FAMILY MEDICINE | Facility: CLINIC | Age: 45
End: 2020-11-18

## 2020-11-19 ENCOUNTER — COMMUNICATION - HEALTHEAST (OUTPATIENT)
Dept: PHARMACY | Facility: CLINIC | Age: 45
End: 2020-11-19

## 2020-11-24 ENCOUNTER — COMMUNICATION - HEALTHEAST (OUTPATIENT)
Dept: PHARMACY | Facility: CLINIC | Age: 45
End: 2020-11-24

## 2020-11-24 ENCOUNTER — AMBULATORY - HEALTHEAST (OUTPATIENT)
Dept: PHARMACY | Facility: CLINIC | Age: 45
End: 2020-11-24

## 2020-11-24 DIAGNOSIS — E11.9 TYPE 2 DIABETES MELLITUS WITHOUT COMPLICATION, WITHOUT LONG-TERM CURRENT USE OF INSULIN (H): ICD-10-CM

## 2020-11-24 DIAGNOSIS — E78.5 DYSLIPIDEMIA: ICD-10-CM

## 2020-11-24 PROCEDURE — 99606 MTMS BY PHARM EST 15 MIN: CPT | Performed by: PHARMACIST

## 2020-11-24 PROCEDURE — 99607 MTMS BY PHARM ADDL 15 MIN: CPT | Performed by: PHARMACIST

## 2020-11-24 RX ORDER — DULAGLUTIDE 1.5 MG/.5ML
1.5 INJECTION, SOLUTION SUBCUTANEOUS
Qty: 2 ML | Refills: 11 | Status: SHIPPED | OUTPATIENT
Start: 2020-11-24 | End: 2021-12-08

## 2020-12-22 ENCOUNTER — AMBULATORY - HEALTHEAST (OUTPATIENT)
Dept: PHARMACY | Facility: CLINIC | Age: 45
End: 2020-12-22

## 2020-12-22 DIAGNOSIS — K59.01 SLOW TRANSIT CONSTIPATION: ICD-10-CM

## 2020-12-22 DIAGNOSIS — E11.9 TYPE 2 DIABETES MELLITUS WITHOUT COMPLICATION, WITHOUT LONG-TERM CURRENT USE OF INSULIN (H): ICD-10-CM

## 2020-12-22 DIAGNOSIS — E78.5 DYSLIPIDEMIA: ICD-10-CM

## 2020-12-22 PROCEDURE — 99607 MTMS BY PHARM ADDL 15 MIN: CPT | Performed by: PHARMACIST

## 2020-12-22 PROCEDURE — 99606 MTMS BY PHARM EST 15 MIN: CPT | Performed by: PHARMACIST

## 2020-12-22 RX ORDER — POLYETHYLENE GLYCOL 3350 17 G/17G
17 POWDER, FOR SOLUTION ORAL DAILY
Qty: 235 G | Refills: 2 | Status: SHIPPED | OUTPATIENT
Start: 2020-12-22 | End: 2023-04-08

## 2020-12-28 ENCOUNTER — OFFICE VISIT - HEALTHEAST (OUTPATIENT)
Dept: FAMILY MEDICINE | Facility: CLINIC | Age: 45
End: 2020-12-28

## 2020-12-28 DIAGNOSIS — M72.0 DUPUYTREN'S CONTRACTURE OF RIGHT HAND: ICD-10-CM

## 2020-12-28 DIAGNOSIS — E11.9 TYPE 2 DIABETES MELLITUS WITHOUT COMPLICATION, WITHOUT LONG-TERM CURRENT USE OF INSULIN (H): ICD-10-CM

## 2020-12-28 DIAGNOSIS — Z23 IMMUNIZATION DUE: ICD-10-CM

## 2020-12-28 LAB — HBA1C MFR BLD: 8.5 %

## 2020-12-28 ASSESSMENT — MIFFLIN-ST. JEOR: SCORE: 1803.57

## 2021-01-19 ENCOUNTER — AMBULATORY - HEALTHEAST (OUTPATIENT)
Dept: PHARMACY | Facility: CLINIC | Age: 46
End: 2021-01-19

## 2021-01-19 DIAGNOSIS — E78.5 DYSLIPIDEMIA: ICD-10-CM

## 2021-01-19 DIAGNOSIS — K59.01 SLOW TRANSIT CONSTIPATION: ICD-10-CM

## 2021-01-19 DIAGNOSIS — E11.9 TYPE 2 DIABETES MELLITUS WITHOUT COMPLICATION, WITHOUT LONG-TERM CURRENT USE OF INSULIN (H): ICD-10-CM

## 2021-01-19 PROCEDURE — 99607 MTMS BY PHARM ADDL 15 MIN: CPT | Performed by: PHARMACIST

## 2021-01-19 PROCEDURE — 99605 MTMS BY PHARM NP 15 MIN: CPT | Performed by: PHARMACIST

## 2021-01-19 RX ORDER — FLASH GLUCOSE SENSOR
1 KIT MISCELLANEOUS
Qty: 6 KIT | Refills: 3 | Status: SHIPPED | OUTPATIENT
Start: 2021-01-19 | End: 2021-09-17

## 2021-01-19 RX ORDER — DOCUSATE SODIUM 100 MG/1
100 CAPSULE, LIQUID FILLED ORAL 2 TIMES DAILY PRN
Qty: 60 CAPSULE | Refills: 3 | Status: SHIPPED | OUTPATIENT
Start: 2021-01-19 | End: 2023-04-08

## 2021-01-19 RX ORDER — FLASH GLUCOSE SCANNING READER
1 EACH MISCELLANEOUS EVERY 8 HOURS
Qty: 1 EACH | Refills: 0 | Status: SHIPPED | OUTPATIENT
Start: 2021-01-19 | End: 2021-09-17

## 2021-02-17 ENCOUNTER — AMBULATORY - HEALTHEAST (OUTPATIENT)
Dept: PHARMACY | Facility: CLINIC | Age: 46
End: 2021-02-17

## 2021-02-17 DIAGNOSIS — E11.9 TYPE 2 DIABETES MELLITUS WITHOUT COMPLICATION, WITHOUT LONG-TERM CURRENT USE OF INSULIN (H): ICD-10-CM

## 2021-02-17 DIAGNOSIS — K59.01 SLOW TRANSIT CONSTIPATION: ICD-10-CM

## 2021-02-17 DIAGNOSIS — E78.5 DYSLIPIDEMIA: ICD-10-CM

## 2021-02-17 PROCEDURE — 99606 MTMS BY PHARM EST 15 MIN: CPT | Performed by: PHARMACIST

## 2021-02-17 PROCEDURE — 99607 MTMS BY PHARM ADDL 15 MIN: CPT | Performed by: PHARMACIST

## 2021-02-20 ENCOUNTER — COMMUNICATION - HEALTHEAST (OUTPATIENT)
Dept: FAMILY MEDICINE | Facility: CLINIC | Age: 46
End: 2021-02-20

## 2021-02-20 DIAGNOSIS — E11.9 TYPE 2 DIABETES MELLITUS WITHOUT COMPLICATION, WITHOUT LONG-TERM CURRENT USE OF INSULIN (H): ICD-10-CM

## 2021-02-22 RX ORDER — METFORMIN HCL 500 MG
TABLET, EXTENDED RELEASE 24 HR ORAL
Qty: 360 TABLET | Refills: 2 | Status: SHIPPED | OUTPATIENT
Start: 2021-02-22 | End: 2021-09-22

## 2021-03-03 ENCOUNTER — AMBULATORY - HEALTHEAST (OUTPATIENT)
Dept: PHARMACY | Facility: CLINIC | Age: 46
End: 2021-03-03

## 2021-03-03 DIAGNOSIS — E78.5 DYSLIPIDEMIA: ICD-10-CM

## 2021-03-03 DIAGNOSIS — K59.01 SLOW TRANSIT CONSTIPATION: ICD-10-CM

## 2021-03-03 DIAGNOSIS — E11.9 TYPE 2 DIABETES MELLITUS WITHOUT COMPLICATION, WITHOUT LONG-TERM CURRENT USE OF INSULIN (H): ICD-10-CM

## 2021-03-03 PROCEDURE — 99606 MTMS BY PHARM EST 15 MIN: CPT | Performed by: PHARMACIST

## 2021-03-03 PROCEDURE — 99607 MTMS BY PHARM ADDL 15 MIN: CPT | Performed by: PHARMACIST

## 2021-03-03 RX ORDER — GLIPIZIDE 5 MG/1
5 TABLET, FILM COATED, EXTENDED RELEASE ORAL
Qty: 30 TABLET | Refills: 3 | Status: SHIPPED | OUTPATIENT
Start: 2021-03-03 | End: 2021-09-17

## 2021-04-07 ENCOUNTER — COMMUNICATION - HEALTHEAST (OUTPATIENT)
Dept: PHARMACY | Facility: CLINIC | Age: 46
End: 2021-04-07

## 2021-04-13 ENCOUNTER — COMMUNICATION - HEALTHEAST (OUTPATIENT)
Dept: PHARMACY | Facility: CLINIC | Age: 46
End: 2021-04-13

## 2021-04-28 ENCOUNTER — OFFICE VISIT - HEALTHEAST (OUTPATIENT)
Dept: FAMILY MEDICINE | Facility: CLINIC | Age: 46
End: 2021-04-28

## 2021-04-28 DIAGNOSIS — H60.391 INFECTIVE OTITIS EXTERNA, RIGHT: ICD-10-CM

## 2021-04-28 DIAGNOSIS — Z01.118 ABNORMAL OTOSCOPIC EXAM OF RIGHT EAR: ICD-10-CM

## 2021-04-28 DIAGNOSIS — E11.9 TYPE 2 DIABETES MELLITUS WITHOUT COMPLICATION, WITHOUT LONG-TERM CURRENT USE OF INSULIN (H): ICD-10-CM

## 2021-04-28 DIAGNOSIS — E78.2 MIXED HYPERLIPIDEMIA: ICD-10-CM

## 2021-04-28 LAB
ALBUMIN SERPL-MCNC: 4.2 G/DL (ref 3.5–5)
ALP SERPL-CCNC: 103 U/L (ref 45–120)
ALT SERPL W P-5'-P-CCNC: 50 U/L (ref 0–45)
ANION GAP SERPL CALCULATED.3IONS-SCNC: 10 MMOL/L (ref 5–18)
AST SERPL W P-5'-P-CCNC: 24 U/L (ref 0–40)
BILIRUB SERPL-MCNC: 0.5 MG/DL (ref 0–1)
BUN SERPL-MCNC: 15 MG/DL (ref 8–22)
CALCIUM SERPL-MCNC: 8.7 MG/DL (ref 8.5–10.5)
CHLORIDE BLD-SCNC: 104 MMOL/L (ref 98–107)
CHOLEST SERPL-MCNC: 99 MG/DL
CO2 SERPL-SCNC: 26 MMOL/L (ref 22–31)
CREAT SERPL-MCNC: 0.98 MG/DL (ref 0.7–1.3)
CREAT UR-MCNC: 154 MG/DL
FASTING STATUS PATIENT QL REPORTED: YES
GFR SERPL CREATININE-BSD FRML MDRD: >60 ML/MIN/1.73M2
GLUCOSE BLD-MCNC: 182 MG/DL (ref 70–125)
HBA1C MFR BLD: 7 %
HDLC SERPL-MCNC: 27 MG/DL
LDLC SERPL CALC-MCNC: 48 MG/DL
MICROALBUMIN UR-MCNC: 12.58 MG/DL (ref 0–1.99)
MICROALBUMIN/CREAT UR: 81.7 MG/G
POTASSIUM BLD-SCNC: 3.8 MMOL/L (ref 3.5–5)
PROT SERPL-MCNC: 7.4 G/DL (ref 6–8)
SODIUM SERPL-SCNC: 140 MMOL/L (ref 136–145)
TRIGL SERPL-MCNC: 119 MG/DL

## 2021-04-28 ASSESSMENT — MIFFLIN-ST. JEOR: SCORE: 1849.04

## 2021-05-19 ENCOUNTER — OFFICE VISIT - HEALTHEAST (OUTPATIENT)
Dept: OTOLARYNGOLOGY | Facility: CLINIC | Age: 46
End: 2021-05-19

## 2021-05-19 DIAGNOSIS — H60.541 ACUTE ECZEMATOID OTITIS EXTERNA OF RIGHT EAR: ICD-10-CM

## 2021-05-19 RX ORDER — TRIAMCINOLONE ACETONIDE 1 MG/G
CREAM TOPICAL
Qty: 15 G | Refills: 0 | Status: SHIPPED | OUTPATIENT
Start: 2021-05-19 | End: 2021-09-17

## 2021-05-19 RX ORDER — BETAMETHASONE DIPROPIONATE 0.5 MG/G
CREAM TOPICAL
Status: SHIPPED | COMMUNITY
Start: 2021-04-01 | End: 2023-04-08

## 2021-05-21 ENCOUNTER — COMMUNICATION - HEALTHEAST (OUTPATIENT)
Dept: OTOLARYNGOLOGY | Facility: CLINIC | Age: 46
End: 2021-05-21

## 2021-05-27 NOTE — TELEPHONE ENCOUNTER
RN cannot approve Refill Request    RN can NOT refill this medication PCP messaged that patient is overdue for Labs. Last office visit: Visit date not found Last Physical: Visit date not found Last MTM visit: Visit date not found Last visit same specialty: 3/21/2019 Usama Merlos MD.  Next visit within 3 mo: Visit date not found  Next physical within 3 mo: Visit date not found  Is this still a current medication for this patient? Written 3/21/2018 with an end date of 3/21/2019.     Sofiya Hart, [facility] Triage/Med Refill 4/7/2019    Requested Prescriptions   Pending Prescriptions Disp Refills     metFORMIN (GLUCOPHAGE) 1000 MG tablet [Pharmacy Med Name: METFORMIN 1000MG TABLETS] 180 tablet 0     Sig: TAKE 1 TABLET(1000 MG) BY MOUTH TWICE DAILY WITH MEALS       Metformin Refill Protocol Failed - 4/5/2019  5:12 PM        Failed - LFT or AST or ALT in last 12 months     Albumin   Date Value Ref Range Status   03/05/2017 3.9 3.5 - 5.0 g/dL Final     Bilirubin, Total   Date Value Ref Range Status   03/05/2017 0.7 0.0 - 1.0 mg/dL Final     Alkaline Phosphatase   Date Value Ref Range Status   03/05/2017 96 45 - 120 U/L Final     AST   Date Value Ref Range Status   03/05/2017 50 (H) 0 - 40 U/L Final     ALT   Date Value Ref Range Status   03/05/2017 126 (H) 0 - 45 U/L Final     Protein, Total   Date Value Ref Range Status   03/05/2017 7.9 6.0 - 8.0 g/dL Final                Passed - Blood pressure in last 12 months     BP Readings from Last 1 Encounters:   03/21/19 134/84             Passed - GFR or Serum Creatinine in last 6 months     GFR MDRD Non Af Amer   Date Value Ref Range Status   03/07/2019 >60 >60 mL/min/1.73m2 Final     GFR MDRD Af Amer   Date Value Ref Range Status   03/07/2019 >60 >60 mL/min/1.73m2 Final             Passed - Visit with PCP or prescribing provider visit in last 6 months or next 3 months     Last office visit with prescriber/PCP: Visit date not found OR same dept: 3/21/2019 Usama Merlos,  MD OR same specialty: 3/21/2019 Usama Merlos MD Last physical: Visit date not found Last MTM visit: Visit date not found         Next appt within 3 mo: Visit date not found  Next physical within 3 mo: Visit date not found  Prescriber OR PCP: Aimee Vallejo MD  Last diagnosis associated with med order: 1. Diabetes mellitus (H)  - metFORMIN (GLUCOPHAGE) 1000 MG tablet [Pharmacy Med Name: METFORMIN 1000MG TABLETS]; TAKE 1 TABLET(1000 MG) BY MOUTH TWICE DAILY WITH MEALS  Dispense: 180 tablet; Refill: 0     If protocol passes may refill for 12 months if within 3 months of last provider visit (or a total of 15 months).           Passed - A1C in last 6 months     Hemoglobin A1c   Date Value Ref Range Status   01/16/2019 7.6 (H) 3.5 - 6.0 % Final               Passed - Microalbumin in last year      Microalbumin, Random Urine   Date Value Ref Range Status   03/07/2019 4.89 (H) 0.00 - 1.99 mg/dL Final

## 2021-05-28 NOTE — TELEPHONE ENCOUNTER
Have called and spoke to patient several times and he stated he will call back to back to schedule MTM FU appt. Patient has an appt in May I will put in those appt notes to schedule MTM FU appt.

## 2021-05-28 NOTE — TELEPHONE ENCOUNTER
Called to schedule appt and I believe spouse hung up, patient has an appt in May and I will put in the appt notes to schedule MTM FU with clinic Pharmacist.

## 2021-05-28 NOTE — PROGRESS NOTES
"Assessment: /    Plan:    1. Cough     2. Elevated blood pressure reading without diagnosis of hypertension         Increase walking.  Recheck in 1 month.  Consider losartan if BP is elevated.      Subjective:    HPI:  Kaveh Peterson is a 43-year-old male presenting to [facility] care.  He has had less drainage since starting cetirizine.  He has had some hoarseness in the past month because of the cough.  He states that he does not have asthma.  Albuterol and prednisone did not help.    Fingerstick glucose readings have been 120-160.  He uses a treadmill at home.      Social Hx: He does not chew betel nut.    Family Hx: Mother has hypertension    Review of Systems: No fever or sputum production.       Current Outpatient Medications   Medication Sig Dispense Refill     atorvastatin (LIPITOR) 20 MG tablet Take 1 tablet (20 mg total) by mouth daily. 90 tablet 3     cetirizine (ZYRTEC) 10 MG tablet Take 1 tablet (10 mg total) by mouth daily. 30 tablet 2     CONTOUR NEXT STRIPS strips USE TO CHECK BLOOD SUGAR TWICE DAILY 150 strip 2     dulaglutide (TRULICITY) 1.5 mg/0.5 mL PnIj Inject 1.5 mg under the skin once a week. 12 Syringe 3     generic lancets (FINGERSTIX LANCETS) Use to check blood sugar twice daily. Dispense brand per patient's insurance at pharmacy discretion. 200 each 3     glipiZIDE (GLUCOTROL XL) 5 MG 24 hr tablet Take 1 tablet (5 mg total) by mouth daily with breakfast. 90 tablet 3     metFORMIN (GLUCOPHAGE) 1000 MG tablet TAKE 1 TABLET(1000 MG) BY MOUTH TWICE DAILY WITH MEALS 180 tablet 0     No current facility-administered medications for this visit.          Objective:    Vitals:    04/15/19 1314 04/15/19 1320 04/15/19 1345   BP: 144/90 144/88 142/84   Pulse: 81     Resp: 20     Temp: 98.1  F (36.7  C)     TempSrc: Oral     SpO2: 98%     Weight: (!) 229 lb 4 oz (104 kg)     Height: 5' 6\" (1.676 m)         Gen:  NAD, VSS  Lungs:  normal  Heart:  normal          ADDITIONAL HISTORY SUMMARIZED (2): Reviewed " March 14 and March 21 notes.  DECISION TO OBTAIN EXTRA INFORMATION (1): None.   RADIOLOGY TESTS (1): None.  LABS (1): None.  MEDICINE TESTS (1): None.  INDEPENDENT REVIEW (2 each): None.     Total Data Points: 2

## 2021-05-28 NOTE — TELEPHONE ENCOUNTER
Thank you!    Saba Gallegos, PharmD  Medication Therapy Management Pharmacist  [facility]

## 2021-05-28 NOTE — TELEPHONE ENCOUNTER
Asthma not currently listed on patient's problem list, however he was placed on the asthma registry 03/14/19.    Next PCP office visit on 5/17/19.    At that time, will Provider please evaluate for the presence or absence of asthma?    Thank you.

## 2021-05-29 NOTE — TELEPHONE ENCOUNTER
Called and spoke with patient.  Patient agreed to HTN and DM appointment with Dr. Ribeiro.  Appointment made.

## 2021-05-29 NOTE — PROGRESS NOTES
Assessment: /    Plan:    1. Type 2 diabetes mellitus without complication, without long-term current use of insulin (H)  Glycosylated Hemoglobin A1c    metFORMIN (GLUCOPHAGE XR) 500 MG 24 hr tablet    glipiZIDE (GLUCOTROL XL) 2.5 MG 24 hr tablet   2. Lichen simplex chronicus  clobetasol (TEMOVATE) 0.05 % ointment   3. Dermatitis  nystatin (MYCOSTATIN) cream       Change metformin to extended release form.  Continue Trulicity and glipizide.    Recheck diabetes in 4 months.    He will request ophthalmologist to send copy of the note to us.      Subjective:    HPI:  Angela Peterson is a 44-year-old male presenting for follow-up on diabetes.  He states that he misses his evening dose of metformin about twice per week.  He would like to switch over to extended release Metformin.    He has been eating a keto diet.    [facility].    He has itching of the left lower leg.  He was treated by the dermatologist for lichen simplex chronicus.    He has perianal irritation.      Review of Systems: No fever or cough.      Current Outpatient Medications   Medication Sig Dispense Refill     atorvastatin (LIPITOR) 20 MG tablet Take 1 tablet (20 mg total) by mouth daily. 90 tablet 3     blood glucose test (CONTOUR NEXT TEST STRIPS) strips USE TO CHECK BLOOD SUGAR ONCE DAILY 150 strip 2     cetirizine (ZYRTEC) 10 MG tablet Take 1 tablet (10 mg total) by mouth daily. 30 tablet 2     dulaglutide (TRULICITY) 1.5 mg/0.5 mL PnIj Inject 1.5 mg under the skin once a week. 12 Syringe 3     generic lancets (FINGERSTIX LANCETS) Use to check blood sugar once daily. Dispense brand per patient's insurance at pharmacy discretion. 200 each 3     clobetasol (TEMOVATE) 0.05 % ointment Apply to affected skin 2 times daily 60 g 3     glipiZIDE (GLUCOTROL XL) 2.5 MG 24 hr tablet Take 1 tablet (2.5 mg total) by mouth daily. 90 tablet 3     metFORMIN (GLUCOPHAGE XR) 500 MG 24 hr tablet Take 4  tablets (2,000 mg total) by mouth daily with breakfast. 360 tablet 3     nystatin (MYCOSTATIN) cream Apply to affected skin daily 30 g 1     No current facility-administered medications for this visit.          Objective:    Vitals:    06/11/19 0821   BP: 134/78   Pulse: 80   Resp: 16   Temp: 97.6  F (36.4  C)   SpO2: 93%       Gen:  NAD, VSS  Lungs:  normal  Heart:  normal  Left lower leg with papules, patches and excoriations.  Anus with slight maceration posteriorly, and slight erythema    A1c is 6.5      ADDITIONAL HISTORY SUMMARIZED (2): Reviewed dermatology note.  DECISION TO OBTAIN EXTRA INFORMATION (1): None.   RADIOLOGY TESTS (1): None.  LABS (1): A1c.  MEDICINE TESTS (1): None.  INDEPENDENT REVIEW (2 each): None.     Total Data Points: 3

## 2021-05-30 ENCOUNTER — HEALTH MAINTENANCE LETTER (OUTPATIENT)
Age: 46
End: 2021-05-30

## 2021-05-30 VITALS — BODY MASS INDEX: 35.2 KG/M2 | HEIGHT: 66 IN | WEIGHT: 219 LBS

## 2021-05-30 NOTE — TELEPHONE ENCOUNTER
Per Provider note 6/11/19 patient was seen at [facility] 6 months ago.    June at [facility] will fax report of Feb 2019 exam to [facility] at [phone].

## 2021-05-30 NOTE — TELEPHONE ENCOUNTER
Medication Request  Medication name: Atorvastatin 20 mg Tablets  Sig: Take 1 tablets (20 mg) by mouth daily  Pharmacy Name and Location: [facility]  Reason for request: Request received from patient's pharmacy.  Appears to have last been filled by another provider in 2018.  When did you use medication last?:  Information not available.  Patient offered appointment:  N/a  Okay to leave a detailed message: no

## 2021-05-31 VITALS — WEIGHT: 220 LBS | HEIGHT: 66 IN | BODY MASS INDEX: 35.36 KG/M2

## 2021-05-31 VITALS — BODY MASS INDEX: 35.52 KG/M2 | WEIGHT: 221 LBS | HEIGHT: 66 IN

## 2021-05-31 VITALS — HEIGHT: 66 IN | WEIGHT: 221 LBS | BODY MASS INDEX: 35.52 KG/M2

## 2021-05-31 VITALS — WEIGHT: 227 LBS | BODY MASS INDEX: 36.48 KG/M2 | HEIGHT: 66 IN

## 2021-06-01 VITALS — WEIGHT: 229 LBS | BODY MASS INDEX: 36.8 KG/M2 | HEIGHT: 66 IN

## 2021-06-01 VITALS — WEIGHT: 230 LBS | BODY MASS INDEX: 37.12 KG/M2

## 2021-06-01 VITALS — WEIGHT: 223.5 LBS | BODY MASS INDEX: 36.07 KG/M2

## 2021-06-01 VITALS — WEIGHT: 233.06 LBS | BODY MASS INDEX: 37.62 KG/M2

## 2021-06-01 VITALS — BODY MASS INDEX: 35.55 KG/M2 | WEIGHT: 220.25 LBS

## 2021-06-01 NOTE — TELEPHONE ENCOUNTER
Refill Approved    Rx renewed per Medication Renewal Policy. Medication was last renewed on 3/21/19, last OV 6/11/19.    Mable Augustine, [facility] Triage/Med Refill 9/29/2019     Requested Prescriptions   Pending Prescriptions Disp Refills     cetirizine (ZYRTEC) 10 MG tablet [Pharmacy Med Name: CETIRIZINE 10MG TABLETS] 30 tablet 0     Sig: TAKE 1 TABLET(10 MG) BY MOUTH DAILY       Antihistamine Refill Protocol Passed - 9/29/2019  5:01 AM        Passed - Patient has had office visit/physical in last year     Last office visit with prescriber/PCP: 3/21/2019 Usama Merlos MD OR same dept: 6/11/2019 Saul Ribeiro MD OR same specialty: 6/11/2019 Saul Ribeiro MD  Last physical: Visit date not found Last MTM visit: Visit date not found   Next visit within 3 mo: Visit date not found  Next physical within 3 mo: Visit date not found  Prescriber OR PCP: Usama Merlos MD  Last diagnosis associated with med order: 1. Cough  - cetirizine (ZYRTEC) 10 MG tablet [Pharmacy Med Name: CETIRIZINE 10MG TABLETS]; TAKE 1 TABLET(10 MG) BY MOUTH DAILY  Dispense: 30 tablet; Refill: 0    2. Post-nasal drip  - cetirizine (ZYRTEC) 10 MG tablet [Pharmacy Med Name: CETIRIZINE 10MG TABLETS]; TAKE 1 TABLET(10 MG) BY MOUTH DAILY  Dispense: 30 tablet; Refill: 0    If protocol passes may refill for 12 months if within 3 months of last provider visit (or a total of 15 months).

## 2021-06-01 NOTE — TELEPHONE ENCOUNTER
RN cannot approve Refill Request    RN can NOT refill this medication med is not covered by policy/route to provider. Medication is not on med list at this time. Last office visit: 3/14/2019 Kathia Case PA-C Last Physical: Visit date not found Last MTM visit: Visit date not found Last visit same specialty: 6/11/2019 Saul Ribeiro MD.  Next visit within 3 mo: Visit date not found  Next physical within 3 mo: Visit date not found      Roseanne Tse, [facility] Triage/Med Refill 9/29/2019    Requested Prescriptions   Pending Prescriptions Disp Refills     montelukast (SINGULAIR) 10 mg tablet [Pharmacy Med Name: MONTELUKAST 10MG TABLETS] 30 tablet 0     Sig: TAKE 1 TABLET(10 MG) BY MOUTH DAILY       There is no refill protocol information for this order

## 2021-06-02 VITALS — BODY MASS INDEX: 36.8 KG/M2 | WEIGHT: 229 LBS | HEIGHT: 66 IN

## 2021-06-02 VITALS — HEIGHT: 66 IN | WEIGHT: 229.25 LBS | BODY MASS INDEX: 36.84 KG/M2

## 2021-06-02 VITALS — WEIGHT: 228.6 LBS | BODY MASS INDEX: 36.9 KG/M2

## 2021-06-02 VITALS — WEIGHT: 231 LBS | BODY MASS INDEX: 37.28 KG/M2

## 2021-06-02 VITALS — BODY MASS INDEX: 36.85 KG/M2 | WEIGHT: 228.3 LBS

## 2021-06-02 NOTE — PROGRESS NOTES
Assessment: /    Plan:    1. Type 2 diabetes mellitus without complication, without long-term current use of insulin (H)  Glycosylated Hemoglobin A1c   2. Lichen simplex chronicus         He will continue current medications.  He will schedule follow-up appointment with the dermatologist.  He wishes to wait with adding lisinopril 2.5 mg, which would potentially improve his microalbumin/creatinine ratio.    Recheck in 4 months.      Subjective:    HPI:  Angela Peterson is a 44-year-old male presenting for follow-up on diabetes.  He has been off Trulicity for 2 months.  He has been following a keto diet.  Previous A1c was 6.5.    Dermatitis on his left leg has worsened.        Review of Systems: No fever or cough.      Current Outpatient Medications   Medication Sig Dispense Refill     atorvastatin (LIPITOR) 20 MG tablet Take 1 tablet (20 mg total) by mouth daily. 90 tablet 3     blood glucose test (CONTOUR NEXT TEST STRIPS) strips USE TO CHECK BLOOD SUGAR ONCE DAILY 150 strip 2     generic lancets (FINGERSTIX LANCETS) Use to check blood sugar once daily. Dispense brand per patient's insurance at pharmacy discretion. 200 each 3     glipiZIDE (GLUCOTROL XL) 2.5 MG 24 hr tablet Take 1 tablet (2.5 mg total) by mouth daily. 90 tablet 3     metFORMIN (GLUCOPHAGE XR) 500 MG 24 hr tablet Take 4 tablets (2,000 mg total) by mouth daily with breakfast. 360 tablet 3     cetirizine (ZYRTEC) 10 MG tablet Take 1 tablet (10 mg total) by mouth daily. 90 tablet 2     clobetasol (TEMOVATE) 0.05 % ointment Apply to affected skin 2 times daily 60 g 3     montelukast (SINGULAIR) 10 mg tablet TAKE 1 TABLET(10 MG) BY MOUTH DAILY 30 tablet 2     nystatin (MYCOSTATIN) cream Apply to affected skin daily 30 g 1     No current facility-administered medications for this visit.          Objective:    Vitals:    10/11/19 0800   BP: 130/70   Pulse: 76   Resp: 18   Temp: 97.9  F (36.6  C)   SpO2: 95%       Gen:  NAD, VSS  Lungs:  normal  Heart:   normal  Left lower leg with significant dermatitis.    A1c is 5.6    ADDITIONAL HISTORY SUMMARIZED (2): None.  DECISION TO OBTAIN EXTRA INFORMATION (1): None.   RADIOLOGY TESTS (1): None.  LABS (1): A1c.  MEDICINE TESTS (1): None.  INDEPENDENT REVIEW (2 each): None.     Total Data Points: 1

## 2021-06-03 VITALS
OXYGEN SATURATION: 95 % | WEIGHT: 226.5 LBS | BODY MASS INDEX: 36.4 KG/M2 | DIASTOLIC BLOOD PRESSURE: 70 MMHG | SYSTOLIC BLOOD PRESSURE: 130 MMHG | HEART RATE: 76 BPM | TEMPERATURE: 97.9 F | HEIGHT: 66 IN | RESPIRATION RATE: 18 BRPM

## 2021-06-03 VITALS — WEIGHT: 225.12 LBS | BODY MASS INDEX: 36.18 KG/M2 | HEIGHT: 66 IN

## 2021-06-04 VITALS
WEIGHT: 219.25 LBS | SYSTOLIC BLOOD PRESSURE: 112 MMHG | BODY MASS INDEX: 34.41 KG/M2 | HEART RATE: 75 BPM | DIASTOLIC BLOOD PRESSURE: 72 MMHG | HEIGHT: 67 IN | TEMPERATURE: 98.2 F | OXYGEN SATURATION: 94 % | RESPIRATION RATE: 20 BRPM

## 2021-06-04 VITALS
WEIGHT: 220 LBS | SYSTOLIC BLOOD PRESSURE: 132 MMHG | TEMPERATURE: 98.2 F | BODY MASS INDEX: 35.36 KG/M2 | DIASTOLIC BLOOD PRESSURE: 64 MMHG | HEART RATE: 102 BPM | RESPIRATION RATE: 24 BRPM | OXYGEN SATURATION: 95 % | HEIGHT: 66 IN

## 2021-06-04 VITALS
RESPIRATION RATE: 20 BRPM | SYSTOLIC BLOOD PRESSURE: 134 MMHG | DIASTOLIC BLOOD PRESSURE: 72 MMHG | WEIGHT: 228.25 LBS | OXYGEN SATURATION: 96 % | HEART RATE: 66 BPM | TEMPERATURE: 98 F | HEIGHT: 66 IN | BODY MASS INDEX: 36.68 KG/M2

## 2021-06-05 VITALS
RESPIRATION RATE: 16 BRPM | WEIGHT: 232.75 LBS | HEART RATE: 77 BPM | OXYGEN SATURATION: 94 % | HEIGHT: 61 IN | DIASTOLIC BLOOD PRESSURE: 74 MMHG | SYSTOLIC BLOOD PRESSURE: 136 MMHG | BODY MASS INDEX: 43.94 KG/M2 | TEMPERATURE: 98.5 F

## 2021-06-05 VITALS
HEART RATE: 72 BPM | OXYGEN SATURATION: 97 % | TEMPERATURE: 97.9 F | HEIGHT: 65 IN | BODY MASS INDEX: 37.82 KG/M2 | SYSTOLIC BLOOD PRESSURE: 128 MMHG | WEIGHT: 227 LBS | DIASTOLIC BLOOD PRESSURE: 78 MMHG

## 2021-06-06 NOTE — TELEPHONE ENCOUNTER
reports indicate that the patient needs asthma action plans, preventative care visit, HIV screening, advanced care planning, pneumonia and flu vaccine, and diabetic eye exam. Chart review shows that the patient was seen February 2020, PCP recommend diabetic check in 4 months (patient already scheduled for DM check June 2020). Notes for June appointment updated. . Per clinic policy, writer will three times prior to closing encounter.

## 2021-06-06 NOTE — TELEPHONE ENCOUNTER
Who is calling:  Patient  Reason for Call:  He is asking why Dr. Ribeiro has not sent in this prescription yet.  Date of last appointment with primary care: 2/11/20  Okay to leave a detailed message: Yes

## 2021-06-06 NOTE — TELEPHONE ENCOUNTER
RN cannot approve Refill Request    RN can NOT refill this medication medication not on med list.       Loren Benedict, [facility] Triage/Med Refill 3/15/2020    Requested Prescriptions   Pending Prescriptions Disp Refills     TRULICITY 1.5 mg/0.5 mL PnIj [Pharmacy Med Name: TRULICITY 1.5MG/0.5ML SDP 4X0.5ML] 6 mL 0     Sig: INJECT 1.5 MG UNDER THE SKIN ONCE A WEEK       Insulin/GLP-1 Refill Protocol Passed - 3/13/2020 12:04 PM        Passed - Visit with PCP or prescribing provider visit in last 6 months     Last office visit with prescriber/PCP: 2/11/2020 OR same dept: 2/11/2020 Saul Rbieiro MD OR same specialty: 2/11/2020 Saul Ribeiro MD Last physical: Visit date not found Last MTM visit: Visit date not found     Next appt within 3 mo: Visit date not found  Next physical within 3 mo: Visit date not found  Prescriber OR PCP: Saul Ribeiro MD  Last diagnosis associated with med order: There are no diagnoses linked to this encounter.  If protocol passes may refill for 6 months if within 3 months of last provider visit (or a total of 9 months).              Passed - A1C in last 6 months     Hemoglobin A1c   Date Value Ref Range Status   02/11/2020 6.7 (H) 3.5 - 6.0 % Final               Passed - Microalbumin in last year     Microalbumin, Random Urine   Date Value Ref Range Status   02/11/2020 9.85 (H) 0.00 - 1.99 mg/dL Final                  Passed - Blood pressure in last year     BP Readings from Last 1 Encounters:   02/11/20 134/72             Passed - Creatinine done in last year     Creatinine   Date Value Ref Range Status   02/11/2020 0.94 0.70 - 1.30 mg/dL Final

## 2021-06-06 NOTE — TELEPHONE ENCOUNTER
RN cannot approve Refill Request    RN can NOT refill this medication dose change. Last office visit: 2/11/2020 Saul Ribeiro MD Last Physical: Visit date not found Last MTM visit: Visit date not found Last visit same specialty: 2/11/2020 Saul Ribeiro MD.  Next visit within 3 mo: Visit date not found  Next physical within 3 mo: Visit date not found      Saba Gallegos, [facility] [place] Connection Triage/Med Refill 3/13/2020    Requested Prescriptions   Pending Prescriptions Disp Refills     glipiZIDE (GLUCOTROL XL) 5 MG 24 hr tablet [Pharmacy Med Name: GLIPIZIDE ER 5MG TABLETS] 90 tablet 3     Sig: TAKE 1 TABLET(5 MG) BY MOUTH DAILY WITH BREAKFAST       Oral Hypoglycemics Refill Protocol Passed - 3/11/2020 11:07 AM        Passed - Visit with PCP or prescribing provider visit in last 6 months       Last office visit with prescriber/PCP: 2/11/2020 OR same dept: 2/11/2020 Saul Ribeiro MD OR same specialty: 2/11/2020 Saul Ribeiro MD Last physical: Visit date not found Last MTM visit: Visit date not found         Next appt within 3 mo: Visit date not found  Next physical within 3 mo: Visit date not found  Prescriber OR PCP: Saul Ribeiro MD  Last diagnosis associated with med order: 1. Type 2 diabetes mellitus without complication, without long-term current use of insulin (H)  - glipiZIDE (GLUCOTROL XL) 5 MG 24 hr tablet [Pharmacy Med Name: GLIPIZIDE ER 5MG TABLETS]; TAKE 1 TABLET(5 MG) BY MOUTH DAILY WITH BREAKFAST  Dispense: 90 tablet; Refill: 3     If protocol passes may refill for 12 months if within 3 months of last provider visit (or a total of 15 months).           Passed - A1C in last 6 months     Hemoglobin A1c   Date Value Ref Range Status   02/11/2020 6.7 (H) 3.5 - 6.0 % Final               Passed - Microalbumin in last year      Microalbumin, Random Urine   Date Value Ref Range Status   02/11/2020 9.85 (H) 0.00 - 1.99 mg/dL Final                  Passed - Blood pressure in last  year     BP Readings from Last 1 Encounters:   02/11/20 134/72             Passed - Serum creatinine in last year     Creatinine   Date Value Ref Range Status   02/11/2020 0.94 0.70 - 1.30 mg/dL Final

## 2021-06-06 NOTE — TELEPHONE ENCOUNTER
glipiZIDE (GLUCOTROL XL) 5 MG 24 hr tablet [[id number]]     Electronically signed by: Saba Gallegos, PharmD on 01/18/19 1805 Status: Discontinued   Ordering user: Saba Gallegos, PharmD 01/18/19 1805 Ordering provider: Saba Gallegos, PharmD   Authorized by: Saul Ribeiro MD   Frequency: Daily with brkfst 01/18/19 - 06/11/19  Discontinued by: Saul Ribeiro MD 06/11/19 0903

## 2021-06-06 NOTE — PROGRESS NOTES
Assessment: /    Plan:    1. Type 2 diabetes mellitus without complication, without long-term current use of insulin (H)  Microalbumin, Random Urine    Glycosylated Hemoglobin A1c   2. Dyslipidemia  Comprehensive Metabolic Panel    Lipid Cascade FASTING       Adjust medications depending upon lab results.    Recheck in 4 months.        Subjective:    HPI:  Angela Peterson is a 44-year-old male presenting for follow-up on diabetes.  He takes metformin and glipizide.       Review of Systems: No fever or cough.      Current Outpatient Medications   Medication Sig Dispense Refill     atorvastatin (LIPITOR) 20 MG tablet Take 1 tablet (20 mg total) by mouth daily. 90 tablet 3     blood glucose test (CONTOUR NEXT TEST STRIPS) strips USE TO CHECK BLOOD SUGAR ONCE DAILY 150 strip 2     cetirizine (ZYRTEC) 10 MG tablet Take 1 tablet (10 mg total) by mouth daily. 90 tablet 2     clobetasol (TEMOVATE) 0.05 % ointment Apply to affected skin 2 times daily 60 g 3     generic lancets (FINGERSTIX LANCETS) Use to check blood sugar once daily. Dispense brand per patient's insurance at pharmacy discretion. 200 each 3     glipiZIDE (GLUCOTROL XL) 2.5 MG 24 hr tablet Take 1 tablet (2.5 mg total) by mouth daily. 90 tablet 3     metFORMIN (GLUCOPHAGE XR) 500 MG 24 hr tablet Take 4 tablets (2,000 mg total) by mouth daily with breakfast. 360 tablet 3     montelukast (SINGULAIR) 10 mg tablet TAKE 1 TABLET(10 MG) BY MOUTH DAILY 30 tablet 2     nystatin (MYCOSTATIN) cream Apply to affected skin daily 30 g 1     No current facility-administered medications for this visit.          Objective:    Vitals:    02/11/20 0823   BP: 134/72   Pulse: 66   Resp: 20   Temp: 98  F (36.7  C)   SpO2: 96%       Gen:  NAD, VSS  Weight increased 2 pounds  Lungs:  normal  Heart:  normal        ADDITIONAL HISTORY SUMMARIZED (2): None.  DECISION TO OBTAIN EXTRA INFORMATION (1): None.   RADIOLOGY TESTS (1): None.  LABS (1): Ordered.  MEDICINE TESTS (1):  None.  INDEPENDENT REVIEW (2 each): None.     Total Data Points: 1

## 2021-06-07 NOTE — TELEPHONE ENCOUNTER
I called pt.  A1c was 6.7 without trulicity.  He stopped keto diet 1 month ago due to not enough time available to do the food preparation required.  Still trying to avoid carbs.  Fasting glucose now about 200.  Plan) increase glipizide to 5 mg.  dlh

## 2021-06-07 NOTE — TELEPHONE ENCOUNTER
Refill Approved    Rx renewed per Medication Renewal Policy. Medication was last renewed on 6/11/19.    Loren Benedict, [facility] Connection Triage/Med Refill 3/19/2020     Requested Prescriptions   Pending Prescriptions Disp Refills     glipiZIDE (GLUCOTROL XL) 2.5 MG 24 hr tablet [Pharmacy Med Name: GLIPIZIDE ER 2.5MG TABLET] 90 tablet 3     Sig: TAKE 1 TABLET(2.5 MG) BY MOUTH DAILY       Oral Hypoglycemics Refill Protocol Passed - 3/17/2020  6:28 PM        Passed - Visit with PCP or prescribing provider visit in last 6 months       Last office visit with prescriber/PCP: 2/11/2020 OR same dept: 2/11/2020 Saul Ribeiro MD OR same specialty: 2/11/2020 Saul Ribeiro MD Last physical: Visit date not found Last MTM visit: Visit date not found         Next appt within 3 mo: Visit date not found  Next physical within 3 mo: Visit date not found  Prescriber OR PCP: Saul Ribeiro MD  Last diagnosis associated with med order: 1. Type 2 diabetes mellitus without complication, without long-term current use of insulin (H)  - glipiZIDE (GLUCOTROL XL) 2.5 MG 24 hr tablet [Pharmacy Med Name: GLIPIZIDE ER 2.5MG TABLET]; TAKE 1 TABLET(2.5 MG) BY MOUTH DAILY  Dispense: 90 tablet; Refill: 3     If protocol passes may refill for 12 months if within 3 months of last provider visit (or a total of 15 months).           Passed - A1C in last 6 months     Hemoglobin A1c   Date Value Ref Range Status   02/11/2020 6.7 (H) 3.5 - 6.0 % Final               Passed - Microalbumin in last year      Microalbumin, Random Urine   Date Value Ref Range Status   02/11/2020 9.85 (H) 0.00 - 1.99 mg/dL Final                  Passed - Blood pressure in last year     BP Readings from Last 1 Encounters:   02/11/20 134/72             Passed - Serum creatinine in last year     Creatinine   Date Value Ref Range Status   02/11/2020 0.94 0.70 - 1.30 mg/dL Final

## 2021-06-07 NOTE — TELEPHONE ENCOUNTER
Who is calling:  Patient   Reason for Call:  Calling back to check status of this medication request. Patient state he stopped taking because he started a Keto Diet . Patient states he is no longer on Keto - Thus the request. Please refill or call and advise . Pharmacy as listed .  Thank You   Date of last appointment with primary care: 02/11/20  Okay to leave a detailed message: Yes    [phone]

## 2021-06-07 NOTE — TELEPHONE ENCOUNTER
RN cannot approve Refill Request    RN can NOT refill this medication medication not on med list.      Loren Benedict, [facility] Triage/Med Refill 3/19/2020    Requested Prescriptions   Pending Prescriptions Disp Refills     TRULICITY 1.5 mg/0.5 mL PnIj [Pharmacy Med Name: TRULICITY 1.5MG/0.5ML SDP 4X0.5ML] 6 mL 0     Sig: INJECT 1.5 MG UNDER THE SKIN ONCE A WEEK       Insulin/GLP-1 Refill Protocol Passed - 3/18/2020  1:50 PM        Passed - Visit with PCP or prescribing provider visit in last 6 months     Last office visit with prescriber/PCP: 2/11/2020 OR same dept: 2/11/2020 Saul Ribeiro MD OR same specialty: 2/11/2020 Saul Ribeiro MD Last physical: Visit date not found Last MTM visit: Visit date not found     Next appt within 3 mo: Visit date not found  Next physical within 3 mo: Visit date not found  Prescriber OR PCP: Saul Ribeiro MD  Last diagnosis associated with med order: There are no diagnoses linked to this encounter.  If protocol passes may refill for 6 months if within 3 months of last provider visit (or a total of 9 months).              Passed - A1C in last 6 months     Hemoglobin A1c   Date Value Ref Range Status   02/11/2020 6.7 (H) 3.5 - 6.0 % Final               Passed - Microalbumin in last year     Microalbumin, Random Urine   Date Value Ref Range Status   02/11/2020 9.85 (H) 0.00 - 1.99 mg/dL Final                  Passed - Blood pressure in last year     BP Readings from Last 1 Encounters:   02/11/20 134/72             Passed - Creatinine done in last year     Creatinine   Date Value Ref Range Status   02/11/2020 0.94 0.70 - 1.30 mg/dL Final

## 2021-06-08 NOTE — PROGRESS NOTES
Clinic Care Coordination Contact    Patient answered the phone when called today for AN assessment but patient states he's still not doing very well and feeling week so he doesn't feel like talking today. Patient requested to call him back in a later date.   Very soft spoken. Had to ask him to speak up.   Had INF with PCP on 5/22/2020.   RESCHEDULED TO 6/9/2020

## 2021-06-08 NOTE — PROGRESS NOTES
Clinic Care Coordination Contact  Community Health Worker Initial Outreach    CHW Initial Information Gathering:  Referral Source: PCP  Preferred Hospital: [facility]  [phone]  Preferred Urgent Care: [facility], [phone]  Current living arrangement:: I live in a private home  Type of residence:: Private home - stairs  Community Resources: None  Supplies used at home:: Diabetic Supplies  Equipment Currently Used at Home: none  Informal Support system:: Family  No PCP office visit in Past Year: Yes  Transportation means:: Medical transport, Family    Patient accepts CC: Yes     The Clinic Community Health Worker spoke with the patient today to discuss possible Clinic Care Coordination enrollment.  The service was described to the patient and immediate needs were discussed.  The patient agreed to enrollment and an assessment was scheduled.  The patient was provided with contact information for the clinic CHW.             Assessment date: 05/21/2020

## 2021-06-08 NOTE — PROGRESS NOTES
"Kaveh Peterson is a 44 y.o. male who is being evaluated via a billable telephone visit.      The patient has been notified of following:     \"This telephone visit will be conducted via a call between you and your physician/provider. We have found that certain health care needs can be provided without the need for a physical exam.  This service lets us provide the care you need with a short phone conversation.  If a prescription is necessary we can send it directly to your pharmacy.  If lab work is needed we can place an order for that and you can then stop by our lab to have the test done at a later time.    Telephone visits are billed at different rates depending on your insurance coverage. During this emergency period, for some insurers they may be billed the same as an in-person visit.  Please reach out to your insurance provider with any questions.    If during the course of the call the physician/provider feels a telephone visit is not appropriate, you will not be charged for this service.\"    Patient has given verbal consent to a Telephone visit? Yes    What phone number would you like to be contacted at? [phone]    Patient would like to receive their AVS by AVS Preference: Davida.     Additional provider notes:   Hospital Follow-up Visit:    Hospital/Nursing Home/IP Rehab Facility: [facility]  Date of Admission: 5/16/20  Date of Discharge: 5/19/20  Reason(s) for Admission: Cough    Was your hospitalization related to COVID-19? Yes   How are you feeling today? Better  In the past 24 hours have you had shortness of breath when speaking, walking, or climbing stairs? I don't have breathing problems  Do you have a cough? Yes, I have a cough but it's not worse  When is the last time you had a fever greater than 100? 2 days  Are you having any other symptoms? NONE   Do you have any other stressors you would like to discuss with your provider? No                  PHQ Assessment Total " Score 10/11/2019   PHQ-2 Total Score 0   Some recent data might be hidden       Was the patient in the ICU or did the patient experience delirium during hospitalization? No            Problems taking medications regularly:  None  Medication changes since discharge: None  Problems adhering to non-medication therapy:  None    Summary of hospitalization:  [facility] hospital discharge summary reviewed  Diagnostic Tests/Treatments reviewed.  Follow up needed: none  Other Healthcare Providers Involved in Patient s Care:         None  Update since discharge: improved.      Post Discharge Medication Reconciliation: discharge medications reconciled, continue medications without change.  Plan of care communicated with patient     A1c was 8.8                Assessment/Plan:  1. COVID-19      2. Cough    - dextromethorphan-guaifenesin  mg/5 mL Liqd; 10 ml 4 times daily as needed for cough  Dispense: 240 mL; Refill: 3        Phone call duration:  11 minutes    Saul Ribeiro MD

## 2021-06-08 NOTE — TELEPHONE ENCOUNTER
Refill Approved    Rx renewed per Medication Renewal Policy. Medication was last renewed on 6/11/19.    Loren Benedict, [facility] Triage/Med Refill 5/28/2020     Requested Prescriptions   Pending Prescriptions Disp Refills     metFORMIN (GLUCOPHAGE-XR) 500 MG 24 hr tablet [Pharmacy Med Name: METFORMIN ER 500MG 24HR TABS] 360 tablet 3     Sig: TAKE 4 TABLETS(2000 MG) BY MOUTH DAILY WITH BREAKFAST       Metformin Refill Protocol Passed - 5/26/2020  5:11 AM        Passed - Blood pressure in last 12 months     BP Readings from Last 1 Encounters:   05/19/20 139/70             Passed - LFT or AST or ALT in last 12 months     Albumin   Date Value Ref Range Status   05/18/2020 3.5 3.5 - 5.0 g/dL Final     Bilirubin, Total   Date Value Ref Range Status   05/18/2020 0.7 0.0 - 1.0 mg/dL Final     Alkaline Phosphatase   Date Value Ref Range Status   05/18/2020 66 45 - 120 U/L Final     AST   Date Value Ref Range Status   05/18/2020 23 0 - 40 U/L Final     ALT   Date Value Ref Range Status   05/18/2020 44 0 - 45 U/L Final     Protein, Total   Date Value Ref Range Status   05/18/2020 6.8 6.0 - 8.0 g/dL Final                Passed - GFR or Serum Creatinine in last 6 months     GFR MDRD Non Af Amer   Date Value Ref Range Status   05/19/2020 >60 >60 mL/min/1.73m2 Final     GFR MDRD Af Amer   Date Value Ref Range Status   05/19/2020 >60 >60 mL/min/1.73m2 Final             Passed - Visit with PCP or prescribing provider visit in last 6 months or next 3 months     Last office visit with prescriber/PCP: 2/11/2020 OR same dept: 2/11/2020 Saul Ribeiro MD OR same specialty: 2/11/2020 Saul Ribeiro MD Last physical: Visit date not found Last MTM visit: Visit date not found         Next appt within 3 mo: Visit date not found  Next physical within 3 mo: Visit date not found  Prescriber OR PCP: Saul Ribeiro MD  Last diagnosis associated with med order: 1. Type 2 diabetes mellitus without complication, without  long-term current use of insulin (H)  - metFORMIN (GLUCOPHAGE-XR) 500 MG 24 hr tablet [Pharmacy Med Name: METFORMIN ER 500MG 24HR TABS]; TAKE 4 TABLETS(2000 MG) BY MOUTH DAILY WITH BREAKFAST  Dispense: 360 tablet; Refill: 3     If protocol passes may refill for 12 months if within 3 months of last provider visit (or a total of 15 months).           Passed - A1C in last 6 months     Hemoglobin A1c   Date Value Ref Range Status   05/17/2020 8.8 (H) <=5.6 % Final     Comment:     Prediabetes:   HBA1c       5.7 to 6.4%        Diabetes:        HBA1c        >=6.5%   Patients with Hgb F >5%, total bilirubin >10.0 mg/dL, abnormal red cell turnover, severe renal or hepatic disease or malignancy should not have this A1C method used to diagnose or monitor diabetes.                   Passed - Microalbumin in last year      Microalbumin, Random Urine   Date Value Ref Range Status   02/11/2020 9.85 (H) 0.00 - 1.99 mg/dL Final

## 2021-06-08 NOTE — PROGRESS NOTES
Clinic Care Coordination Contact  No show #1  Due Date: Within 2 weeks from today's date, 5/21/2020  Delegation: No Show for RN appointment. Please follow unsuccessful outreach, no show standard work.  Patient didn't answer his phone.   CCC RN spoke with spouse and per spouse, patient still sleeping.   Requested spouse to tell patient to call writer back but patient didn't call writer back till the end of the shift.

## 2021-06-09 NOTE — TELEPHONE ENCOUNTER
Refill Approved    Rx renewed per Medication Renewal Policy. Medication was last renewed on 6/3/19.    Loren Benedict, [facility] Triage/Med Refill 6/25/2020     Requested Prescriptions   Pending Prescriptions Disp Refills     generic lancets (MICROLET LANCET) [Pharmacy Med Name: MICROLET COLORED LANCETS 100] 200 each 3     Sig: USE TO CHECK BLOOD SUGARS ONCE DAILY.       Diabetic Supplies Refill Protocol Passed - 6/25/2020  6:40 AM        Passed - Visit with PCP or prescribing provider visit in last 6 months     Last office visit with prescriber/PCP: 2/11/2020 Saul Ribeiro MD OR same dept: 2/11/2020 Saul Ribeiro MD OR same specialty: 2/11/2020 Saul Ribeiro MD  Last physical: Visit date not found Last MTM visit: Visit date not found   Next visit within 3 mo: Visit date not found  Next physical within 3 mo: Visit date not found  Prescriber OR PCP: Saul Ribeiro MD  Last diagnosis associated with med order: 1. Type 2 diabetes mellitus without complication, without long-term current use of insulin (H)  - MICROLET LANCET [Pharmacy Med Name: MICROLET COLORED LANCETS 100]; USE TO CHECK BLOOD SUGARS ONCE DAILY.  Dispense: 200 each; Refill: 3    If protocol passes may refill for 12 months if within 3 months of last provider visit (or a total of 15 months).             Passed - A1C in last 6 months     Hemoglobin A1c   Date Value Ref Range Status   05/17/2020 8.8 (H) <=5.6 % Final     Comment:     Prediabetes:   HBA1c       5.7 to 6.4%        Diabetes:        HBA1c        >=6.5%   Patients with Hgb F >5%, total bilirubin >10.0 mg/dL, abnormal red cell turnover, severe renal or hepatic disease or malignancy should not have this A1C method used to diagnose or monitor diabetes.

## 2021-06-09 NOTE — PROGRESS NOTES
Subjective:   Saw Nicholas presents today because of a two-week history of cough.  The cough is keeping him up at nighttime.  He feels like this thick phlegm in the chest that he can't cough up.  He gets winded at times.  When he talks a lot such as at school he will get more of a cough.  This is very frustrating.  He's had some chills at home.  He had a chest x-ray over a week ago which showed some stranding in the bases of both lungs.  He's been using an inhaler which has not helped his cough at all.  Over-the-counter medications for cough have not been helping either.  He denies significant sore throat.  He denies chest pain.  He's had no nausea or vomiting.  Appetite is down slightly.  He's had no diarrhea.      Objective:  HEENT: The maxillary sinuses are mildly tender.  Frontal areas are nontender.  Conjunctivae are clear.  Nasal mucosa slightly inflamed but good air flow noted bilaterally.  Fascia reveals minimal erythema.  Uvula is midline.  TMs are gray.  Neck: No lymphadenopathy present.  Lungs: There are a few expiratory wheezes heard.  Fair airflow noted.  Crackles are heard in the bases but they seem to clear with coughing.  The patient's in no obvious respiratory distress during my exam but he did have a wet sounding cough.  Cardiac: There is a regular rhythm present.  No murmur heard.  Skin: Skin is warm and dry.      Assessment:  1.  Bronchitis with mild bronchospasm  2.  Cough secondary to #1      Plan:  The patient will try to take extra liquids.  He'll start Mucinex at home.  He will increase steam in the home and humidity in the home as well.  We will start Augmentin 875 mg twice daily due to the significant length of time he said symptoms.  Also start Tessalon pearls 200 mg 3 times daily as needed for cough.  He will follow-up here if symptoms worsen.

## 2021-06-09 NOTE — PROGRESS NOTES
Assessment: /    Plan:    1. Type 2 diabetes mellitus without complication, without long-term current use of insulin (H)  glipiZIDE (GLUCOTROL XL) 10 MG 24 hr tablet       Increase glipizide to 10 mg.    Recheck in 3 months.      Subjective:    HPI:  Angela Peterson is a 45-year-old male presenting for follow-up on diabetes.  Fingerstick glucose is usually about 230.  Weight has decreased 8 pounds.  He has been taking glipizide 5 mg daily, in addition to metformin 2000 mg/day.    A1c was 8.8 on May 17, in the hospital.      Review of Systems: No fever or cough.      Current Outpatient Medications   Medication Sig Dispense Refill     atorvastatin (LIPITOR) 20 MG tablet TAKE 1 TABLET(20 MG) BY MOUTH DAILY 90 tablet 3     blood glucose test (CONTOUR NEXT TEST STRIPS) strips USE TO CHECK BLOOD SUGAR ONCE DAILY 150 strip 2     cetirizine (ZYRTEC) 10 MG tablet Take 1 tablet (10 mg total) by mouth daily. 90 tablet 2     generic lancets (MICROLET LANCET) USE TO CHECK BLOOD SUGARS ONCE DAILY. 200 each 3     metFORMIN (GLUCOPHAGE-XR) 500 MG 24 hr tablet TAKE 4 TABLETS(2000 MG) BY MOUTH DAILY WITH BREAKFAST 360 tablet 2     ascorbic acid, vitamin C, (VITAMIN C) 500 MG tablet Take 1 tablet (500 mg total) by mouth 2 (two) times a day.  0     glipiZIDE (GLUCOTROL XL) 10 MG 24 hr tablet Take 1 tablet (10 mg total) by mouth daily. 90 tablet 3     ketoconazole (NIZORAL) 2 % cream Apply 1 application topically 2 (two) times a day. For 30 days       No current facility-administered medications for this visit.          Objective:    Vitals:    07/07/20 1205   BP: 132/64   Pulse: (!) 102   Resp: 24   Temp: 98.2  F (36.8  C)   SpO2: 95%       Gen:  NAD, VSS  Lungs:  normal  Heart:  normal          ADDITIONAL HISTORY SUMMARIZED (2): None.  DECISION TO OBTAIN EXTRA INFORMATION (1): None.   RADIOLOGY TESTS (1): None.  LABS (1): Reviewed A1c.  MEDICINE TESTS (1): None.  INDEPENDENT REVIEW (2 each): None.     Total Data Points: 1

## 2021-06-09 NOTE — TELEPHONE ENCOUNTER
Refill Approved    Rx renewed per Medication Renewal Policy. Medication was last renewed on 7/19/19.    Loren Benedict, [facility] Triage/Med Refill 6/25/2020     Requested Prescriptions   Pending Prescriptions Disp Refills     atorvastatin (LIPITOR) 20 MG tablet [Pharmacy Med Name: ATORVASTATIN 20MG TABLETS] 90 tablet 3     Sig: TAKE 1 TABLET(20 MG) BY MOUTH DAILY       Statins Refill Protocol (Hmg CoA Reductase Inhibitors) Passed - 6/25/2020  6:40 AM        Passed - PCP or prescribing provider visit in past 12 months      Last office visit with prescriber/PCP: 3/21/2019 Usama Merlos MD OR same dept: 2/11/2020 Saul Ribeiro MD OR same specialty: 2/11/2020 Saul Ribeiro MD  Last physical: Visit date not found Last MTM visit: Visit date not found   Next visit within 3 mo: Visit date not found  Next physical within 3 mo: Visit date not found  Prescriber OR PCP: Usama Merlos MD  Last diagnosis associated with med order: 1. Type 2 diabetes mellitus without complication, without long-term current use of insulin (H)  - atorvastatin (LIPITOR) 20 MG tablet [Pharmacy Med Name: ATORVASTATIN 20MG TABLETS]; TAKE 1 TABLET(20 MG) BY MOUTH DAILY  Dispense: 90 tablet; Refill: 3    2. Dyslipidemia  - atorvastatin (LIPITOR) 20 MG tablet [Pharmacy Med Name: ATORVASTATIN 20MG TABLETS]; TAKE 1 TABLET(20 MG) BY MOUTH DAILY  Dispense: 90 tablet; Refill: 3    If protocol passes may refill for 12 months if within 3 months of last provider visit (or a total of 15 months).

## 2021-06-10 NOTE — TELEPHONE ENCOUNTER
Refill Approved    Rx renewed per Medication Renewal Policy. Medication was last renewed on 6/3/19.    Gladis Sauer, [facility] Triage/Med Refill 7/25/2020     Requested Prescriptions   Pending Prescriptions Disp Refills     blood glucose test (CONTOUR NEXT TEST STRIPS) strips [Pharmacy Med Name: CONTOUR NEXT TEST STRIPS 50S] 150 strip 2     Sig: USE TO CHECK BLOOD SUGAR ONCE DAILY       Diabetic Supplies Refill Protocol Passed - 7/22/2020  5:45 PM        Passed - Visit with PCP or prescribing provider visit in last 6 months     Last office visit with prescriber/PCP: 7/7/2020 Saul Ribeiro MD OR same dept: 7/7/2020 Saul Ribeiro MD OR same specialty: 7/7/2020 Saul Ribeiro MD  Last physical: Visit date not found Last MTM visit: Visit date not found   Next visit within 3 mo: Visit date not found  Next physical within 3 mo: Visit date not found  Prescriber OR PCP: Saul Ribeiro MD  Last diagnosis associated with med order: 1. Type 2 diabetes mellitus without complication, without long-term current use of insulin (H)  - CONTOUR NEXT TEST STRIPS strips [Pharmacy Med Name: CONTOUR NEXT TEST STRIPS 50S]; USE TO CHECK BLOOD SUGAR ONCE DAILY  Dispense: 150 strip; Refill: 2    If protocol passes may refill for 12 months if within 3 months of last provider visit (or a total of 15 months).             Passed - A1C in last 6 months     Hemoglobin A1c   Date Value Ref Range Status   05/17/2020 8.8 (H) <=5.6 % Final     Comment:     Prediabetes:   HBA1c       5.7 to 6.4%        Diabetes:        HBA1c        >=6.5%   Patients with Hgb F >5%, total bilirubin >10.0 mg/dL, abnormal red cell turnover, severe renal or hepatic disease or malignancy should not have this A1C method used to diagnose or monitor diabetes.

## 2021-06-10 NOTE — PROGRESS NOTES
Subjective:   Saw Nicholas comes in today with a four-day history of swelling around his right ear.  It has been slightly tender.  He denies any injury to the area.  He denies having any bite marks in the area.  He does not think he has had any fevers, sweats or chills.  He denies sore throat.  He has had no nasal congestion or ear plugging.  He has had no cough of any kind.      Objective:  HEENT: Sinuses are nontender.  Conjunctivae are clear.  Both TMs are gray.  The pharynx has no erythema.  There is swelling noted in the right subauricular area.  This is at the angle of the jaw.  No firm masses palpable.  No skin breakdown present.  No exudative process noted.  Neck: No significant lymphadenopathy noted in the anterior posterior cervical triangles.  Lungs: Lungs are totally clear.  Cardiac: There is a regular rhythm present.  No murmur heard.      Assessment:  1.  Parotitis.  Rule out bacterial infection.  Rule out viral infection      Plan:  The patient will be started on Augmentin 875 mg twice a day.  He will warm pack the area.  I want see him back here in 1-2 weeks to recheck this area.  We will recheck his diabetes at that point as well.  Continue all other present medications.  He will follow-up sooner if he feels like his symptoms are worsening.

## 2021-06-11 NOTE — PROGRESS NOTES
Assessment: /    Plan:    1. Type 2 diabetes mellitus without complication, without long-term current use of insulin (H)  Glycosylated Hemoglobin A1c    pioglitazone (ACTOS) 15 MG tablet   2. Mixed hyperlipidemia  Lipid [place] FASTING       Begin pioglitazone 15 mg.  Recheck in 3 months.      Subjective:    HPI:  Angela Peterson is a 45-year-old male presenting for follow-up on diabetes.  Fingersticks 200-260.  Had been on keto diet in the past, is unable to do so now.  Had used trulicity and pioglitazone in the past.        Review of Systems:  No fever or cough.      Current Outpatient Medications   Medication Sig Dispense Refill     atorvastatin (LIPITOR) 20 MG tablet TAKE 1 TABLET(20 MG) BY MOUTH DAILY 90 tablet 3     blood glucose test (CONTOUR NEXT TEST STRIPS) strips USE TO CHECK BLOOD SUGAR ONCE DAILY 150 strip 2     generic lancets (MICROLET LANCET) USE TO CHECK BLOOD SUGARS ONCE DAILY. 200 each 3     glipiZIDE (GLUCOTROL XL) 10 MG 24 hr tablet Take 1 tablet (10 mg total) by mouth daily. 90 tablet 3     metFORMIN (GLUCOPHAGE-XR) 500 MG 24 hr tablet TAKE 4 TABLETS(2000 MG) BY MOUTH DAILY WITH BREAKFAST 360 tablet 2     ascorbic acid, vitamin C, (VITAMIN C) 500 MG tablet Take 1 tablet (500 mg total) by mouth 2 (two) times a day. (Patient not taking: Reported on 8/26/2020)  0     cetirizine (ZYRTEC) 10 MG tablet Take 1 tablet (10 mg total) by mouth daily. (Patient not taking: Reported on 8/26/2020) 90 tablet 2     pioglitazone (ACTOS) 15 MG tablet Take 1 tablet (15 mg total) by mouth daily. 90 tablet 3     No current facility-administered medications for this visit.          Objective:    Vitals:    08/26/20 1146   BP: 112/72   Pulse: 75   Resp: 20   Temp: 98.2  F (36.8  C)   SpO2: 94%       Gen:  NAD, VSS  Lungs:  normal  Heart:  normal    A1c = 10.3, had been 8.8      ADDITIONAL HISTORY SUMMARIZED (2): None.  DECISION TO OBTAIN EXTRA INFORMATION (1): None.   RADIOLOGY TESTS (1): None.  LABS (1):  A1c.  MEDICINE TESTS (1): None.  INDEPENDENT REVIEW (2 each): None.     Total Data Points: 1

## 2021-06-13 NOTE — TELEPHONE ENCOUNTER
----- Message from Lele Karimi sent at 11/9/2020 12:14 PM CST -----  Regarding: A1C>9  Last a1c was 10.3. Please schedule with Bhavani

## 2021-06-13 NOTE — PROGRESS NOTES
Subjective:   Saw Nicholas presents for check of his diabetes. He denies any polyuria or polydipsia. He does not check his sugars. Overall he is feeling well. He tries to exercise routinely. He follows a good well-balanced diet. He also was here for check of a rash. He set a rash over both legs in his right arm. The rash has been there for three years. It seems to be getting worse. The creams we have tried have not helped the rash at all. It itches a lot.      Objective:  HEENT: pupils equally round and reacts to light. Fund our benign. Pharynx is clear.  Neck: neck reveals no thyromegaly. No increased JVD noted.  Lungs: lungs today are clear. Patients in no respiratory distress.  Cardiac: there is a regular rhythm present. No murmur heard.  Skin: there is an erythematous papular dermatitis over the right forearm as well as both tibial areas. No exudate a process noted. Skin has chronic changes secondary to chronic scratching..  Extremities: the feet had no edema. Pulses were strong. Sensory exam of the toes is normal. Skin integrity is normal.      Assessment:  1.  Diabetes mellitus  2.  Dermatitis. Rule out eczema      Plan:  Diabetic labs will be done today. The patient will be called if abnormalities. He will continue a good exercise program and follow a good diet. We did discuss starting to check sugars but he is not interested in that at this point in time. He will be sent to the dermatologist for evaluation of his chronic dermatitis. Follow-up in three months for recheck of his diabetes.

## 2021-06-13 NOTE — TELEPHONE ENCOUNTER
Called patient on 11.18.2020 to schedule MTM Follow-up appt he was driving he stated he woulsd call us back.

## 2021-06-13 NOTE — PROGRESS NOTES
MTM Follow Up Encounter  Assessment & Plan                                                     1. Type 2 diabetes mellitus without complication, without long-term current use of insulin (H)  Needs improvement, A1c vastly elevated to goal of less than 7%.  Patient currently taking Metformin, glipizide, and pioglitazone with little improvement in blood sugars.  Patient requesting refill of Trulicity, which has previously worked well for him, agreeable with this switch today to help with weight loss and diabetes control.  Patient due for follow-up A1c.  Also, once blood sugars are better under control, would recommend follow-up microalbumin to check if ACEi/ARB is warranted.  Plan  -Initiate Trulicity 0.75 mg weekly x1 to 2 months, then increase to 1.5 mg weekly, Rx sent today  -Discontinue pioglitazone 15 mg    Future assessment: Recheck A1c & microalbumin    2. Dyslipidemia  Patient appropriately taking moderate intensity, atorvastatin 20 mg daily without medication side effects.  Recommend continuation without change.  Lipids were monitored within the last year.    Follow Up  Return in about 4 weeks (around 12/22/2020) for Medication Review.    Subjective & Objective                                                       Saw OREN Peterson is a 45 y.o. male called for an initial medication therapy management. He was referred to me from Saul Perez MD.     Patient consented to a telehealth visit: Yes  Reason for visit: A1c greater than 9%/initial MTM visit  Medication Adherence/Access: Self managed from pillbox, one times a day.  States that he never misses medication doses.     Diabetes:  Pt currently taking glipizide XL 10 mg once daily with breakfast, and metformin ER 2,000 mg once daily with breakfast, and was recently started on pioglitazone 15 mg daily.  Although, patient states that he does not think the pioglitazone has worked for him in the past and does not think that there has been much difference since  initiating it.  Patient is endorsing constipation - have been using dulcolax. No longer taking Trulicity, stopped bc his BG was too low with that and keto diet. Was on a keto diet for a while and his BG was good. Thinks dieting would be tough for him right now.   Diet: He tries to eat twice daily and avoid carbohydrates.   Did have some weight loss after COVID-19 infection in May.   SMBG: Testing 3-4 times weekly - fasting. *patient reported* - BG always around 200, 230, 250. Did have a 69 BG recently when he was hunting.  ACEi/ARB: None at this time  Statin: Atorvastatin 20 mg daily  Lab Results   Component Value Date    HGBA1C 10.3 (H) 08/26/2020    HGBA1C 8.8 (H) 05/17/2020    HGBA1C 6.7 (H) 02/11/2020     Lab Results   Component Value Date    MICROALBUR 9.85 (H) 02/11/2020    LDLCALC 41 08/26/2020    CREATININE 0.99 05/19/2020     Wt Readings from Last 3 Encounters:   08/26/20 219 lb 4 oz (99.5 kg)   07/07/20 220 lb (99.8 kg)   05/16/20 (!) 229 lb 0.3 oz (103.9 kg)     BP Readings from Last 3 Encounters:   08/26/20 112/72   07/07/20 132/64   05/19/20 139/70     Hypercholestremia: Atorvastatin 20 mg daily. No concerns with this medication at this time, denies side effects.  Patient states that he needs a refill of this medication, aware of how to get refills from the pharmacy.  Lab Results   Component Value Date    CHOL 90 08/26/2020    CHOL 170 02/11/2020    CHOL 93 03/07/2019     Lab Results   Component Value Date    HDL 27 (L) 08/26/2020    HDL 31 (L) 02/11/2020    HDL 27 (L) 03/07/2019     Lab Results   Component Value Date    LDLCALC 41 08/26/2020    LDLCALC 112 02/11/2020    LDLCALC 42 03/07/2019     Lab Results   Component Value Date    TRIG 109 08/26/2020    TRIG 134 02/11/2020    TRIG 122 03/07/2019     PMH: reviewed in EPIC   Allergies/ADRs: reviewed in EPIC   Alcohol: Drinks sometimes, glass of wine once in a while  Tobacco:   Social History     Tobacco Use   Smoking Status Never Smoker   Smokeless  Tobacco Never Used   ----------------    The patient declined an after visit summary    I spent 30 minutes with this patient today;   All changes were made via collaborative practice agreement with Dr. Ribeiro. A copy of the visit note was provided to the patient's provider.     Saba Henry), PharmD  Medication Therapy Management Pharmacist  [facility]    Telemedicine Visit Details    Type of service:  Telephone     Start Time: 3:00 PM  End Time (time video/phone call stopped): 3:30 PM    Originating Location (pt. Location): Home    Distant Location (provider location):  [facility] MED[facility] THERAPY [facility]NAG[facility]    Mode of Communication:   Telephone        Current Outpatient Medications   Medication Sig Dispense Refill     atorvastatin (LIPITOR) 20 MG tablet TAKE 1 TABLET(20 MG) BY MOUTH DAILY 90 tablet 3     blood glucose test (CONTOUR NEXT TEST STRIPS) strips USE TO CHECK BLOOD SUGAR ONCE DAILY 150 strip 2     dulaglutide (TRULICITY) 0.75 mg/0.5 mL PnIj Inject 0.75 mg under the skin every 7 days. 2 mL 1     dulaglutide (TRULICITY) 1.5 mg/0.5 mL PnIj Inject 1.5 mg under the skin every 7 days. 2 mL 11     generic lancets (MICROLET LANCET) USE TO CHECK BLOOD SUGARS ONCE DAILY. 200 each 3     glipiZIDE (GLUCOTROL XL) 10 MG 24 hr tablet Take 1 tablet (10 mg total) by mouth daily. 90 tablet 3     metFORMIN (GLUCOPHAGE-XR) 500 MG 24 hr tablet TAKE 4 TABLETS(2000 MG) BY MOUTH DAILY WITH BREAKFAST 360 tablet 2     No current facility-administered medications for this visit.

## 2021-06-14 NOTE — PROGRESS NOTES
Medication Therapy Management (MTM) Encounter  Assessment:                                                      1. Type 2 diabetes mellitus without complication, without long-term current use of insulin (H)  Last A1c not quite meeting goal of less than 7%, but greatly increased from previously.  Patient reported blood sugars today are variable but not yet meeting goal.  When A1c was recently checked, he had only been taking Trulicity for a short amount of time, recommend follow-up A1c in 3 months prior to making any other medication adjustments.  Patient appropriately taking moderate intensity statin, though not currently taking ACE inhibitor, recommend follow-up microalbumin at next visit to determine if ACE inhibitor would be beneficial for kidney protection.  Discussed with patient today that a CGM is a great option to check blood sugars more regularly without finger pokes, though depending on patient's insurance coverage this may or may not be covered, agreeable to send trial prescription today.    2. Dyslipidemia  Appropriately taking moderate intensity, atorvastatin 20 mg daily without medication side effects.  Lipids were monitored within the last year, no changes recommended today.    3. Slow transit constipation  Needs improvement, patient continues to experience constipation despite adding MiraLAX at last visit.  Discussed with patient today that increased hydration and exercise can greatly help with regular bowel movements.  Additionally, will have patient reduce to taking MiraLAX once daily as prescribed and initiate docusate to help with constipation, patient agreeable.    Plan:                                                     1.  Sent prescription to check coverage of freestyle gilda, continuous glucose monitor  2.  Recheck A1c in March prior to any medication changes  3.  Recommend follow-up microalbumin at next face-to-face visit; consider initiating ACE inhibitor if elevated  4.  Patient to take  MiraLAX once daily as prescribed  5.  Patient to initiate docusate twice daily for constipation and increase water intake    Follow Up  Return in about 29 days (around 2/17/2021) for Medication Review.    Subjective & Objective                                                     Saw OREN Peterson is a 45 y.o. male called for an initial visit for Medication Therapy Management. He was referred to me from Saul Ribeiro MD.     Reason for visit: Initial MTM  Medication Adherence/Access: Self managed from pillbox, one time a day.  States that he never misses medication doses.      Diabetes:  Pt currently taking glipizide XL 10 mg once daily with breakfast, metformin ER 2,000 mg once daily with breakfast, and trulicity 1.5 mg weekly. Patient is endorsing continued constipation - have been using miralax recently prescribed. No change in appetite lately.   Diet: He tries to eat twice daily and avoid carbohydrates. Eating rice once a day.  Previously was on a keto diet, his blood sugars were controlled at this time.    Exercise: He knows that he should be going to the gym but states that he has been mainly home and working from home recently.   SMBG: Checking every morning. At home have been 180-200 sometimes lower. No hypoglycemia.  Patient inquiring today about a CGM to monitor blood sugars.  ACEi/ARB: None at this time  Statin: Atorvastatin 20 mg daily  Lab Results   Component Value Date    HGBA1C 8.5 (H) 12/28/2020    HGBA1C 10.3 (H) 08/26/2020    HGBA1C 8.8 (H) 05/17/2020     Lab Results   Component Value Date    MICROALBUR 9.85 (H) 02/11/2020    LDLCALC 41 08/26/2020    CREATININE 0.99 05/19/2020     Wt Readings from Last 3 Encounters:   12/28/20 (!) 232 lb 12 oz (105.6 kg)   08/26/20 219 lb 4 oz (99.5 kg)   07/07/20 220 lb (99.8 kg)     Hypercholestremia: Atorvastatin 20 mg daily. No concerns with this medication at this time, denies side effects.      Constipation: Miralax 17 g twice daily (currently prescribed  once daily).  Patient comments that despite using his MiraLAX persistently, he continues to have constipation.  Patient previously purchased an OTC stool softener, not sure which one, though has not been recently using this.     PMH: reviewed in EPIC   Allergies/ADRs: reviewed in EPIC   Alcohol: 1-2 drinks per week  Tobacco:   Social History     Tobacco Use   Smoking Status Never Smoker   Smokeless Tobacco Never Used     ----------------    The patient declined an after visit summary    I spent 30 minutes with this patient today.   All changes were made via collaborative practice agreement with Saul Ribeiro MD. A copy of the visit note was provided to the patient's provider.     Saba De La Paz (El), PharmD, BCACP  Medication Therapy Management Pharmacist  [facility]    Telemedicine Visit Details    Type of service:  Telephone     Start Time: 3:00 PM  End Time: 3:30 PM    Originating Location (pt. Location): Home    Distant Location (provider location):  [place] MEDICATION THERAPY MANAGEMENT [facility]    Mode of Communication: Telephone    Current Outpatient Medications   Medication Sig Dispense Refill     atorvastatin (LIPITOR) 20 MG tablet TAKE 1 TABLET(20 MG) BY MOUTH DAILY 90 tablet 3     blood glucose test (CONTOUR NEXT TEST STRIPS) strips USE TO CHECK BLOOD SUGAR ONCE DAILY 150 strip 2     docusate sodium (COLACE) 100 MG capsule Take 1 capsule (100 mg total) by mouth 2 (two) times a day as needed for constipation. 60 capsule 3     dulaglutide (TRULICITY) 1.5 mg/0.5 mL PnIj Inject 1.5 mg under the skin every 7 days. 2 mL 11     flash glucose scanning reader (FREESTYLE LAWRENCE 14 DAY READER) Misc Use 1 Units As Directed every 8 (eight) hours. 1 each 0     flash glucose sensor (FREESTYLE LAWRENCE 14 DAY SENSOR) Kit Use 1 Units As Directed every 14 (fourteen) days. 6 kit 3     generic lancets (MICROLET LANCET) USE TO CHECK BLOOD SUGARS ONCE DAILY. 200 each 3     glipiZIDE  (GLUCOTROL XL) 10 MG 24 hr tablet Take 1 tablet (10 mg total) by mouth daily. 90 tablet 3     metFORMIN (GLUCOPHAGE-XR) 500 MG 24 hr tablet TAKE 4 TABLETS(2000 MG) BY MOUTH DAILY WITH BREAKFAST 360 tablet 2     polyethylene glycol (GLYCOLAX) 17 gram/dose powder Take 17 g by mouth daily. 235 g 2     No current facility-administered medications for this visit.         Medication Therapy Recommendations  Constipation    Current Medication: docusate sodium (COLACE) 100 MG capsule   Rationale: Synergistic therapy - Needs additional medication therapy - Indication   Recommendation: Start Medication - docusate   Status: Accepted per CPA          Current Medication: polyethylene glycol (GLYCOLAX) 17 gram/dose powder   Rationale: Dose too high - Dosage too high - Safety   Recommendation: Decrease Dose - 17 g daily   Status: Patient Agreed - Adherence/Education

## 2021-06-14 NOTE — PROGRESS NOTES
MTM Follow Up Encounter  Assessment & Plan                                                     1. Type 2 diabetes mellitus without complication, without long-term current use of insulin (H)  Unable to fully determine diabetes control today.  Last A1c was not meeting goal of less than 7%.  Recommended follow-up A1c at PCP visit next week, patient notes that he may want to wait until 3 full months of Trulicity before checking his A1c.  Additionally discussed today that I would recommend a follow-up microalbumin to determine if ACE inhibitor/ARB therapy is warranted for kidney protection.  For now, patient plans to increase Trulicity dose to 1.5 mg once weekly starting on Thursday.  Recommended continued monitoring of diet and increased exercise as able along with medication changes.  Plan  -Trulicity dose increase this week: 1.5 mg weekly  -Recommend follow-up A1c and microalbumin at PCP visit on 12/28    2. Dyslipidemia  Appropriately taking moderate intensity, atorvastatin 20 mg daily without medication side effects.  Lipids were monitored within the last year, no changes recommended.    3. Slow transit constipation  Patient continues to experience constipation, despite bisacodyl use.  Recommended patient try MiraLAX 17 g once daily x1 week and then as needed for constipation.  Recommend holding bisacodyl while trying MiraLAX therapy.  Plan  -Sent prescription for MiraLAX 17 g daily as needed    Follow Up  Return in about 4 weeks (around 1/19/2021) for Medication Review.  12/28 PCP  Subjective & Objective                                                       Saw OREN Peterson is a 45 y.o. male called for a follow up medication therapy management. He was referred to me from Saul Perez MD.     Patient consented to a telehealth visit: Yes  Reason for visit: Follow Up from MTM visit on 11/24/20  Medication Adherence/Access: Self managed from pillbox, one times a day.  States that he never misses medication doses.      Diabetes:  Pt currently taking glipizide XL 10 mg once daily with breakfast, and metformin ER 2,000 mg once daily with breakfast. Patient is endorsing constipation - have been using dulcolax. Tried the stool softener before but did not like that. No change in appetite.   Diet: He tries to eat twice daily and avoid carbohydrates. Eating rice once a day.  Previously was on a keto diet, his blood sugars were controlled at this time.  Does not think that this diet is appropriate at this time in his life.  Exercise: Does not exercise a lot.   Did have some weight loss after COVID-19 infection in May.   SMBG: Checking BG has been 200 recently. Getting a little bit better. 312 this morning but usually is 200, 189, 180.   ACEi/ARB: None at this time  Statin: Atorvastatin 20 mg daily  Lab Results   Component Value Date    HGBA1C 10.3 (H) 08/26/2020    HGBA1C 8.8 (H) 05/17/2020    HGBA1C 6.7 (H) 02/11/2020     Lab Results   Component Value Date    MICROALBUR 9.85 (H) 02/11/2020    LDLCALC 41 08/26/2020    CREATININE 0.99 05/19/2020     Wt Readings from Last 3 Encounters:   08/26/20 219 lb 4 oz (99.5 kg)   07/07/20 220 lb (99.8 kg)   05/16/20 (!) 229 lb 0.3 oz (103.9 kg)     BP Readings from Last 3 Encounters:   08/26/20 112/72   07/07/20 132/64   05/19/20 139/70     Hypercholestremia: Atorvastatin 20 mg daily. No concerns with this medication at this time, denies side effects.    Lab Results   Component Value Date    LDLCALC 41 08/26/2020     Constipation: Patient continues to experience intermittent constipation.  Patient continues to take Dulcolax as needed, typically about 2 times a week for constipation.  Patient states that he has tried stool softener before but did not think that it worked well for him.  Patient is unsure if he is used MiraLAX before, states that he may have.    PMH: reviewed in EPIC   Allergies/ADRs: reviewed in EPIC   Alcohol: Drinks sometimes, glass of wine once in a while  Tobacco:   Social  History     Tobacco Use   Smoking Status Never Smoker   Smokeless Tobacco Never Used   ----------------    The patient declined an after visit summary    I spent 30 minutes with this patient today;   All changes were made via collaborative practice agreement with Dr. Ribeiro. A copy of the visit note was provided to the patient's provider.     Saba Henry), PharmD, BCACP  Medication Therapy Management Pharmacist  [facility]    Telemedicine Visit Details    Type of service:  Telephone     Start Time: 3:00 PM  End Time (time video/phone call stopped): 3:30 PM    Originating Location (pt. Location): Home    Distant Location (provider location):  [facility] MEDICATION THERAPY MANAGEMENT VIRTUALLY    Mode of Communication:   Telephone          Current Outpatient Medications   Medication Sig Dispense Refill     atorvastatin (LIPITOR) 20 MG tablet TAKE 1 TABLET(20 MG) BY MOUTH DAILY 90 tablet 3     blood glucose test (CONTOUR NEXT TEST STRIPS) strips USE TO CHECK BLOOD SUGAR ONCE DAILY 150 strip 2     dulaglutide (TRULICITY) 1.5 mg/0.5 mL PnIj Inject 1.5 mg under the skin every 7 days. 2 mL 11     generic lancets (MICROLET LANCET) USE TO CHECK BLOOD SUGARS ONCE DAILY. 200 each 3     glipiZIDE (GLUCOTROL XL) 10 MG 24 hr tablet Take 1 tablet (10 mg total) by mouth daily. 90 tablet 3     metFORMIN (GLUCOPHAGE-XR) 500 MG 24 hr tablet TAKE 4 TABLETS(2000 MG) BY MOUTH DAILY WITH BREAKFAST 360 tablet 2     polyethylene glycol (GLYCOLAX) 17 gram/dose powder Take 17 g by mouth daily. 235 g 2     No current facility-administered medications for this visit.           Medication Therapy Recommendations  Constipation    Current Medication: polyethylene glycol (GLYCOLAX) 17 gram/dose powder   Rationale: Synergistic therapy - Needs additional medication therapy - Indication   Recommendation: Start Medication - miralax   Status: Accepted per CPA

## 2021-06-14 NOTE — PROGRESS NOTES
Assessment: /    Plan:    1. Type 2 diabetes mellitus without complication, without long-term current use of insulin (H)  Glycosylated Hemoglobin A1c    JIC RED   2. Dupuytren's contracture of right hand     3. Immunization due  Influenza, Recombinant, Inj, Quadrivalent, PF, 18+YRS       Continue current medications.    He has MTM follow-up January 19.    Recheck with me in 4 months.    He will stretch the right long finger, to prevent contracture from increasing.      Subjective:    HPI:  Angela Peterson is a 45-year-old male presenting for follow-up on diabetes.  He was taking pioglitazone for 2 months following his previous appointment and switched over to Trulicity 1 month ago.  Hemoglobin A1c had been 10.3 in August, and improved to 8.5 today.    He noticed a thickened area in the right palm a few weeks ago.  Something fell on his left great toe 1 month ago.      Review of Systems: No fever or cough.      Current Outpatient Medications   Medication Sig Dispense Refill     atorvastatin (LIPITOR) 20 MG tablet TAKE 1 TABLET(20 MG) BY MOUTH DAILY 90 tablet 3     blood glucose test (CONTOUR NEXT TEST STRIPS) strips USE TO CHECK BLOOD SUGAR ONCE DAILY 150 strip 2     dulaglutide (TRULICITY) 1.5 mg/0.5 mL PnIj Inject 1.5 mg under the skin every 7 days. 2 mL 11     generic lancets (MICROLET LANCET) USE TO CHECK BLOOD SUGARS ONCE DAILY. 200 each 3     glipiZIDE (GLUCOTROL XL) 10 MG 24 hr tablet Take 1 tablet (10 mg total) by mouth daily. 90 tablet 3     metFORMIN (GLUCOPHAGE-XR) 500 MG 24 hr tablet TAKE 4 TABLETS(2000 MG) BY MOUTH DAILY WITH BREAKFAST 360 tablet 2     polyethylene glycol (GLYCOLAX) 17 gram/dose powder Take 17 g by mouth daily. 235 g 2     No current facility-administered medications for this visit.          Objective:    Vitals:    12/28/20 1450   BP: 136/74   Pulse: 77   Resp: 16   Temp: 98.5  F (36.9  C)   SpO2: 94%       Gen:  NAD, VSS  Weight increased 13 pounds compared to August.  Lungs:   normal  Heart:  normal  Right hand with slight Dupuytren's contracture of the of long finger.  Feet with no ulcer, normal monofilament testing.  Small subungual hematoma of the left great toe.        ADDITIONAL HISTORY SUMMARIZED (2): Reviewed MTM note.  DECISION TO OBTAIN EXTRA INFORMATION (1): None.   RADIOLOGY TESTS (1): None.  LABS (1): A1c.  MEDICINE TESTS (1): None.  INDEPENDENT REVIEW (2 each): None.     Total Data Points: 3

## 2021-06-15 NOTE — PROGRESS NOTES
"SUBJECTIVE  Saw S Nicholas is a 42 y.o. male here for     Swelling right side of face: had this same thing once before and was given antibiotics. On chart review, was diagnosed with parotiditis and given course of augmentin. He says the antibiotics helped last time. His symptoms of jaw swelling started 2 days ago with pain. Painful with eating/chewing. Taking tylenol every 6 hours to help with the pain. Ibuprofen makes him dizzy. No difficulty with swallowing. No drooling. No tooth pain. No drainage. Reportedly saw dentist 3 months ago. No history of dental caries in that area. Denies fevers. Has associated rhinorrhea- cold symptoms that developed one day ago and his kids have similar symptoms. No ear pain or fevers. No history of trauma to the area or dental procedures.    Diabetes type 2: Last A1C was 9.2 on 10/3/17. He is on  Metformin 2000 mg daily and glipizide 10 mg XL. He does not check his sugars regularly. He has a brother that is on insulin and he does not want to start insulin. He has eye exam tomorrow at [facility]. Denies polyuria or polydipsia or abdominal pain.       Reviewed Past Medical History, Problem List, Medications, and Social History in Epic and up to date with no new changes.    OBJECTIVE  /70  Pulse 80  Temp 97.7  F (36.5  C) (Oral)   Resp 20  Ht 5' 6\" (1.676 m)  Wt (!) 227 lb (103 kg)  SpO2 95%  BMI 36.64 kg/m2     General: Cooperative, pleasant, in no acute distress  HEENT: TM normal bilaterally, no palpable stones on the floor of the mouth. No obvious dental caries or abscesses. MMM. Mildly tender, firm, non-erythematous area of swelling over pre-auricular area extending into angle of mandible over parotid gland, no purulent drainage  CV: RRR, normal S1/S2, no murmur, rubs, gallops  Resp: No respiratory distress. Clear to auscultation bilaterally. No wheezes, rales, rhonchi    ASSESSMENT/PLAN:   Saw was seen today for facial swelling.    Diagnoses and all orders for " this visit:    Parotiditis: Over right parotid gland, exam is consistent with parotid gland enlargement. Fully immunized so unlikely mumps. He has associated viral URI symptoms, so most likely viral etiology. He is afebrile, well appearing and no difficulty with swallowing or drooling. No signs of deep neck space infection. Discussed warm compresses and staying hydrated. He was previously given course of antibiotics when this occurred back in May which relieved his symptoms, so I did give him course of augmentin as well.         -     amoxicillin-clavulanate (AUGMENTIN) 875-125 mg per tablet; Take 1 tablet by mouth 2 (two) times a day for 10 days.    Health maintenance:  -     Tdap vaccine,  8yo or older,  IM    Diabetes type 2, uncontrolled: Last A1C 9.2 on 10/3/17. Does not check sugars regularly but has meter at home and reports that he knows how to use it. Will continue metformin at 2000 mg daily but increase his glipizide XL from 10 to 20 mg daily. May need additional agent in the future. Will need repeat A1C in 1 month.   -     Microalbumin, Random Urine  -     Has eye appointment tomorrow  -     glipiZIDE (GLUCOTROL XL) 10 MG 24 hr tablet; Take 2 tablets (20 mg total) by mouth daily.      RTC in 1 month for follow-up diabetes    Options for treatment and follow-up care were reviewed with the patient. Saw S Nicholas and/or guardian was engaged and actively involved in the decision making process. Saw S Nicholas and/or guardian verbalized understanding of the options discussed and was satisfied with the final plan.      Peg Flood MD

## 2021-06-15 NOTE — PROGRESS NOTES
Medication Therapy Management (MTM) Encounter  Assessment:                                                      1. Type 2 diabetes mellitus without complication, without long-term current use of insulin (H)  Most recent A1c was improved, though still not meeting goal of less than 7%.  Primary issue today is patient's reported recent hypoglycemia once drastically changing his diet.  Discussed with patient today that completely avoiding carbohydrates is not necessarily the best diet choices, though the patient has the right to his own decisions.  If choosing to continue a keto diet, recommended discontinuing glipizide, patient agreeable to try.  Discussed with patient today blood sugar goals and will follow up with patient in 2 weeks.  Patient appropriately taking moderate intensity statin, recommend follow-up microalbumin at next visit to determine if ACE inhibitor would be beneficial for kidney protection.  Patient now using CGM, sent invitation for patient to approve communication of blood sugars to MTM pharmacist via [employer].    2. Dyslipidemia  Appropriately taking moderate intensity, atorvastatin 20 mg daily without medication side effects.  Lipids were monitored within the last year, no changes recommended.    3. Slow transit constipation  Despite using medications as prescribed, patient continues to express inconsistent bowel movements.  Recommended increased hydration and time as patient has recently made dietary adjustments.    Plan:                                                     1.  MTM pharmacist sent request to patient in [employer] for closer monitoring of patient's blood sugars    Future: Follow-up microalbumin at next face-to-face visit    Follow Up  Return in about 2 weeks (around 3/3/2021) for Medication Review.    Subjective & Objective                                                     Saw OREN Peterson is a 45 y.o. male called for an initial visit for Medication Therapy Management. He was  referred to me from Saul Ribeiro MD.     Reason for visit: Follow Up from [facility] visit on 1/19/21  Medication Adherence/Access: Self managed from pillbox, one time a day.  States that he never misses medication doses.      Diabetes:  Pt currently taking glipizide XL 10 mg once daily with breakfast, metformin ER 2,000 mg once daily with breakfast, and trulicity 1.5 mg weekly. Denies medication side effects. No change in appetite lately.   Diet: Changed his diet a couple weeks ago - not eating any carbohydrates.  Exercise: He knows that he should be going to the gym but states that he has been mainly home and working from home recently.   SMBG: Now using [employer] CGM. Fasting is 130-140. After medication his BG goes too low. Checks his BG many times throughout the day. BG not going over 200.  Experienced hypoglycemia recently because he was avoiding carbohydrates. Once his BG was in the 50s, many other times having BG in the 60-70s.  ACEi/ARB: None at this time  Statin: Atorvastatin 20 mg daily  Lab Results   Component Value Date    HGBA1C 8.5 (H) 12/28/2020    HGBA1C 10.3 (H) 08/26/2020    HGBA1C 8.8 (H) 05/17/2020     Lab Results   Component Value Date    MICROALBUR 9.85 (H) 02/11/2020    LDLCALC 41 08/26/2020    CREATININE 0.99 05/19/2020     Wt Readings from Last 3 Encounters:   12/28/20 (!) 232 lb 12 oz (105.6 kg)   08/26/20 219 lb 4 oz (99.5 kg)   07/07/20 220 lb (99.8 kg)     Hypercholestremia: Atorvastatin 20 mg daily. No concerns with this medication at this time, denies side effects.      Constipation: Miralax 17 g once daily, docusate sodium twice daily.  States that he doesn't consistently drink water.  Still having constipation at times, remains irregular.     PMH: reviewed in EPIC   Allergies/ADRs: reviewed in EPIC   Alcohol: 1-2 drinks per week  Tobacco:   Social History     Tobacco Use   Smoking Status Never Smoker   Smokeless Tobacco Never Used     ----------------    The patient declined an after  visit summary    I spent 20 minutes with this patient today.   All changes were made via collaborative practice agreement with Saul Ribeiro MD. A copy of the visit note was provided to the patient's provider.     Saba Henry), PharmD, BCACP  Medication Therapy Management Pharmacist  [facility]    Telemedicine Visit Details    Type of service:  Telephone     Start Time: 3:00 PM  End Time (time video/phone call stopped): 3:20 PM    Originating Location (pt. Location): Home    Distant Location (provider location):  [facility] MEDICATION THERAPY MANAGEMENT VIRTUALLY    Mode of Communication:   Telephone       Current Outpatient Medications   Medication Sig Dispense Refill     atorvastatin (LIPITOR) 20 MG tablet TAKE 1 TABLET(20 MG) BY MOUTH DAILY 90 tablet 3     blood glucose test (CONTOUR NEXT TEST STRIPS) strips USE TO CHECK BLOOD SUGAR ONCE DAILY 150 strip 2     docusate sodium (COLACE) 100 MG capsule Take 1 capsule (100 mg total) by mouth 2 (two) times a day as needed for constipation. 60 capsule 3     dulaglutide (TRULICITY) 1.5 mg/0.5 mL PnIj Inject 1.5 mg under the skin every 7 days. 2 mL 11     flash glucose scanning reader (FREESTYLE LAWRENCE 14 DAY READER) Misc Use 1 Units As Directed every 8 (eight) hours. 1 each 0     flash glucose sensor (FREESTYLE LAWRENCE 14 DAY SENSOR) Kit Use 1 Units As Directed every 14 (fourteen) days. 6 kit 3     generic lancets (MICROLET LANCET) USE TO CHECK BLOOD SUGARS ONCE DAILY. 200 each 3     metFORMIN (GLUCOPHAGE-XR) 500 MG 24 hr tablet TAKE 4 TABLETS(2000 MG) BY MOUTH DAILY WITH BREAKFAST 360 tablet 2     polyethylene glycol (GLYCOLAX) 17 gram/dose powder Take 17 g by mouth daily. 235 g 2     No current facility-administered medications for this visit.         Medication Therapy Recommendations  Type 2 diabetes mellitus without complication, without long-term current use of insulin (H)    Current Medication: glipiZIDE (GLUCOTROL XL) 10  MG 24 hr tablet (Discontinued)   Rationale: Undesirable effect - Adverse medication event - Safety   Recommendation: Discontinue Medication - stp oglipizdie   Status: Accepted per CPA

## 2021-06-15 NOTE — PROGRESS NOTES
Medication Therapy Management (MTM) Encounter  Assessment:                                                      1. Type 2 diabetes mellitus without complication, without long-term current use of insulin (H)  Last A1c improved, though still not meeting goal of less than 7%.  Patient reports recent increase in blood sugars since discontinuation of glipizide, therefore would recommend retrial with lower dose glipizide, patient agreeable.  Sent new prescription for glipizide today.  Patient recently accepted invitation on [employer], though still has yet to set up an account.  Patient appropriately taking moderate intensity statin, recommend microalbumin at next visit to determine if ACE inhibitor would be beneficial for kidney protection.    2. Dyslipidemia  Appropriately taking moderate intensity statin, atorvastatin 20 mg daily without medication side effects.  Lipids were monitored within the last year, no changes recommended today.    3. Slow transit constipation  Patient continues to take MiraLAX and stool softener as needed for constipation, no changes recommended today.    Plan:                                                     1.  Sent prescription to restart glipizide at lower dose: Glipizide 5 mg once daily  2.  MTM pharmacist sent request to patient in [employer] for closer monitoring of patient's blood sugars; patient to set up [employer] view account    Future: Follow-up microalbumin at next face-to-face visit    Follow Up  Return in about 5 weeks (around 4/7/2021) for Medication Review.  PCP 4/28  Subjective & Objective                                                     Saw OREN Peterson is a 45 y.o. male called for a follow up visit for Medication Therapy Management. He was referred to me from Saul Ribeiro MD.     Reason for visit: Follow Up from MTM visit on 2/17/21  Medication Adherence/Access: Self managed from pillbox, one time a day.  States that he never misses medication doses.       Diabetes:  Pt currently taking metformin ER 2,000 mg once daily with breakfast, and trulicity 1.5 mg weekly. Denies medication side effects. No change in appetite lately.   Diet: Changed his diet a couple weeks ago - not eating any carbohydrates. Still trying to eat low carb.   Exercise: He knows that he should be going to the gym but states that he has been mainly home and working from home recently.   SMBG: Now using [employer] CGM. PP BG is 250, 255, 260, -150   No longer experiencing hypoglycemia.   ACEi/ARB: None at this time  Statin: Atorvastatin 20 mg daily  Lab Results   Component Value Date    HGBA1C 8.5 (H) 12/28/2020    HGBA1C 10.3 (H) 08/26/2020    HGBA1C 8.8 (H) 05/17/2020     Lab Results   Component Value Date    MICROALBUR 9.85 (H) 02/11/2020    LDLCALC 41 08/26/2020    CREATININE 0.99 05/19/2020     Wt Readings from Last 3 Encounters:   12/28/20 (!) 232 lb 12 oz (105.6 kg)   08/26/20 219 lb 4 oz (99.5 kg)   07/07/20 220 lb (99.8 kg)     Hypercholestremia: Atorvastatin 20 mg daily. No concerns with this medication at this time, denies side effects.      Constipation: Miralax 17 g once daily, docusate sodium twice daily.  Patient states that current medications are working very well for him.  No concerns today.    PMH: reviewed in EPIC   Allergies/ADRs: reviewed in EPIC   Alcohol: 1-2 drinks per week  Tobacco:   Social History     Tobacco Use   Smoking Status Never Smoker   Smokeless Tobacco Never Used     ----------------    The patient declined an after visit summary    I spent 20 minutes with this patient today.   All changes were made via collaborative practice agreement with Saul Ribeiro MD. A copy of the visit note was provided to the patient's provider.     Saba Henry), PharmD, BCACP  Medication Therapy Management Pharmacist  [facility]    Telemedicine Visit Details    Type of service:  Telephone     Start Time: 3:00 PM  End Time (time  video/phone call stopped): 3:20 PM    Originating Location (pt. Location): Home    Distant Location (provider location):  [facility] MEDICATION THERAPY MANAGEMENT VIRTUALLY    Mode of Communication:   Telephone       Current Outpatient Medications   Medication Sig Dispense Refill     atorvastatin (LIPITOR) 20 MG tablet TAKE 1 TABLET(20 MG) BY MOUTH DAILY 90 tablet 3     blood glucose test (CONTOUR NEXT TEST STRIPS) strips USE TO CHECK BLOOD SUGAR ONCE DAILY 150 strip 2     docusate sodium (COLACE) 100 MG capsule Take 1 capsule (100 mg total) by mouth 2 (two) times a day as needed for constipation. 60 capsule 3     dulaglutide (TRULICITY) 1.5 mg/0.5 mL PnIj Inject 1.5 mg under the skin every 7 days. 2 mL 11     flash glucose scanning reader (FREESTYLE LAWRENCE 14 DAY READER) Misc Use 1 Units As Directed every 8 (eight) hours. 1 each 0     flash glucose sensor (FREESTYLE LAWRENCE 14 DAY SENSOR) Kit Use 1 Units As Directed every 14 (fourteen) days. 6 kit 3     generic lancets (MICROLET LANCET) USE TO CHECK BLOOD SUGARS ONCE DAILY. 200 each 3     glipiZIDE (GLUCOTROL XL) 5 MG 24 hr tablet Take 1 tablet (5 mg total) by mouth daily with breakfast. 30 tablet 3     metFORMIN (GLUCOPHAGE-XR) 500 MG 24 hr tablet TAKE 4 TABLETS(2000 MG) BY MOUTH DAILY WITH BREAKFAST 360 tablet 2     polyethylene glycol (GLYCOLAX) 17 gram/dose powder Take 17 g by mouth daily. 235 g 2     No current facility-administered medications for this visit.         Medication Therapy Recommendations  Type 2 diabetes mellitus without complication, without long-term current use of insulin (H)    Current Medication: glipiZIDE (GLUCOTROL XL) 5 MG 24 hr tablet   Rationale: Synergistic therapy - Needs additional medication therapy - Indication   Recommendation: Start Medication - glipizide 5 mg daily   Status: Accepted per CPA

## 2021-06-15 NOTE — PROGRESS NOTES
Subjective:   Saw comes in today for check of his diabetes.  He denies any polyuria or polydipsia.  His last A1c was not controlled well.  He is now on 2000 mg of metformin and 20 mg of glipizide daily.  He is starting to exercise more.  He has joined a health club.  He also would like to lose some weight.  He really does not follow any specific diet.  He usually will not eat breakfast.  He denies any vision changes.  He denies tingling in his fingers or toes.      Objective:  HEENT: Fundi appear benign.  No exudates or hemorrhages noted.  Conjunctivae clear.  Pharynx clear.  Neck: Neck reveals no increased JVD.  Cardiac: There is a regular rhythm present.  No murmur heard.  Lungs: Lungs today are clear bilaterally.  Musculoskeletal: Feet have good pulses.  Sensory exam normal the toes.  Skin integrity normal.      Assessment:  1.  Diabetes mellitus not in good control      Plan:  Continue present medications.  Glycosylated hemoglobin will be done today.  Patient will be called with abnormalities.  If this continues to be elevated we will need to add medications.  Setting up an appointment with the Pharm.D. also may be beneficial.

## 2021-06-15 NOTE — TELEPHONE ENCOUNTER
RN cannot approve Refill Request    RN can NOT refill this medication PCP messaged that patient is overdue for Labs. Last office visit: 12/28/2020 Saul Ribeiro MD Last Physical: Visit date not found Last MTM visit: Visit date not found Last visit same specialty: 12/28/2020 Saul Ribeiro MD.  Next visit within 3 mo: Visit date not found  Next physical within 3 mo: Visit date not found      Mable Augustine, [facility] Triage/Med Refill 2/20/2021    Requested Prescriptions   Pending Prescriptions Disp Refills     metFORMIN (GLUCOPHAGE-XR) 500 MG 24 hr tablet [Pharmacy Med Name: METFORMIN ER 500MG 24HR TABS] 360 tablet 2     Sig: TAKE 4 TABLETS(2000 MG) BY MOUTH DAILY WITH BREAKFAST       Metformin Refill Protocol Failed - 2/20/2021  5:15 AM        Failed - Microalbumin in last year      Microalbumin, Random Urine   Date Value Ref Range Status   02/11/2020 9.85 (H) 0.00 - 1.99 mg/dL Final                  Passed - Blood pressure in last 12 months     BP Readings from Last 1 Encounters:   12/28/20 136/74             Passed - LFT or AST or ALT in last 12 months     Albumin   Date Value Ref Range Status   05/18/2020 3.5 3.5 - 5.0 g/dL Final     Bilirubin, Total   Date Value Ref Range Status   05/18/2020 0.7 0.0 - 1.0 mg/dL Final     Alkaline Phosphatase   Date Value Ref Range Status   05/18/2020 66 45 - 120 U/L Final     AST   Date Value Ref Range Status   05/18/2020 23 0 - 40 U/L Final     ALT   Date Value Ref Range Status   05/18/2020 44 0 - 45 U/L Final     Protein, Total   Date Value Ref Range Status   05/18/2020 6.8 6.0 - 8.0 g/dL Final                Passed - GFR or Serum Creatinine in last 6 months     GFR MDRD Non Af Amer   Date Value Ref Range Status   05/19/2020 >60 >60 mL/min/1.73m2 Final     GFR MDRD Af Amer   Date Value Ref Range Status   05/19/2020 >60 >60 mL/min/1.73m2 Final             Passed - Visit with PCP or prescribing provider visit in last 6 months or next 3 months     Last  office visit with prescriber/PCP: 12/28/2020 OR same dept: 12/28/2020 Saul Ribeiro MD OR same specialty: 12/28/2020 Saul Ribeiro MD Last physical: Visit date not found Last MTM visit: Visit date not found         Next appt within 3 mo: Visit date not found  Next physical within 3 mo: Visit date not found  Prescriber OR PCP: Saul Ribeiro MD  Last diagnosis associated with med order: 1. Type 2 diabetes mellitus without complication, without long-term current use of insulin (H)  - metFORMIN (GLUCOPHAGE-XR) 500 MG 24 hr tablet [Pharmacy Med Name: METFORMIN ER 500MG 24HR TABS]; TAKE 4 TABLETS(2000 MG) BY MOUTH DAILY WITH BREAKFAST  Dispense: 360 tablet; Refill: 2     If protocol passes may refill for 12 months if within 3 months of last provider visit (or a total of 15 months).           Passed - A1C in last 6 months     Hemoglobin A1c   Date Value Ref Range Status   12/28/2020 8.5 (H) <=5.6 % Final

## 2021-06-16 PROBLEM — U07.1 COVID-19 VIRUS INFECTION: Status: ACTIVE | Noted: 2020-05-17

## 2021-06-16 PROBLEM — E11.9 TYPE 2 DIABETES MELLITUS WITHOUT COMPLICATION, WITHOUT LONG-TERM CURRENT USE OF INSULIN (H): Status: ACTIVE | Noted: 2018-05-21

## 2021-06-16 PROBLEM — J96.21 ACUTE AND CHRONIC RESPIRATORY FAILURE WITH HYPOXIA (H): Status: ACTIVE | Noted: 2020-05-17

## 2021-06-16 PROBLEM — M72.0 DUPUYTREN'S CONTRACTURE OF RIGHT HAND: Status: ACTIVE | Noted: 2021-01-02

## 2021-06-16 PROBLEM — L28.0 LICHEN SIMPLEX CHRONICUS: Status: ACTIVE | Noted: 2019-06-11

## 2021-06-16 PROBLEM — U07.1 COVID-19: Status: ACTIVE | Noted: 2020-05-17

## 2021-06-16 PROBLEM — E78.2 MIXED HYPERLIPIDEMIA: Status: ACTIVE | Noted: 2020-08-26

## 2021-06-16 PROBLEM — R07.9 ACUTE CHEST PAIN: Status: ACTIVE | Noted: 2020-05-16

## 2021-06-16 NOTE — TELEPHONE ENCOUNTER
Called patient for telephone MTM visit today, family member answered and states that he does not feel well and is sleeping. Unable to complete visit or reschedule visit today. Recommended patient to call me back when able at [phone].    Saba Henry), PharmD, BCACP  Medication Therapy Management Pharmacist  [facility]

## 2021-06-16 NOTE — PROGRESS NOTES
MTM Encounter    Assessment/Plan  Diabetes: Uncontrolled to goal 7%.  We spent greater than 20 minutes today discussing pathophysiology and progression of diabetes.  We reviewed several drug classes including TZD, SGL T2, DDP 4, and GLP-1s.   Kaveh was engaged in the conversation and we agreed to do a combination of pioglitazone and Trulicity.  If he develops low blood sugars, plan is to reduce or eliminate glipizide.  Patient was instructed on the use and potential side effects of these medications and was directed towards the Trulicity co-pay card online. Saw will self titrate Trulicity after 1 month from 0.75-1.5 mg per week.  Needs new glucometer and supplies.  Up to date on foot exam and eye exam.  -Start Trulicity 0.75 mg once weekly, increase to 1.5 mg weekly after 1 month  -Start pioglitazone 30 mg once daily  -Exercise and diet control measures  - Glucometer, test strips and lancets QAM testing    Follow Up  Two months      Subjective/Objective  Kaveh Peterson is a 42 y.o. male here for an initial medication therapy management (MTM) appointment; his chief concern today is a comprehensive medication review.    Medication Review: Kaveh does not bring in his medications today.  He is only taking     Diabetes: Kaveh is taking metformin 1000mg BID and glipizide XL 10mg BID.  He broke his lancing device several years ago and has not checked his blood sugar since then.  Lab Results   Component Value Date    HGBA1C 9.6 (H) 02/05/2018    HGBA1C 9.2 (H) 10/03/2017    HGBA1C 9.7 (H) 12/13/2016     Lab Results   Component Value Date    MICROALBUR 1.08 12/28/2017    LDLCALC 88 10/03/2017    CREATININE 1.10 10/03/2017     ASCVD 3.1%    ----------------    Saw's medication list was reviewed with them, discussing reason for use, directions for use, and potential side effects of each medication as needed. Indication, safety, efficacy, and convenience was assessed for all medications addressed above.  No environmental factors were  noted currently affecting patient.  This care plan was communicated via EMR with his primary care provider, Primitivo Lance MD, who is the authorizing prescriber for this visit.  Direct supervision was available by either the patient's PCP or other available provider.  Time and complexity billing metrics are included in the docflowsheet linked to this visit.  Time spent: 50 minutes      Conor Carpenter PharmD  [facility] Pharmacist  [facility]  [phone]

## 2021-06-17 NOTE — PROGRESS NOTES
HPI: This patient is a 46yo M who presents for evaluation of itching of his right ear at the request of Dr. Ribeiro. He was seen by his PCP, who noted that the TM appeared abnormal, and gave him cortisporin drops. He used them for only 2 days and stopped because it didn't make any difference in the itching. He noticed a blister on the outside of the ear that popped on its own. He has no hearing concerns and no ear pain.      Past medical history, past surgical history, social history, medications, and allergies have been reviewed with the patient and are documented above.    Review of Systems: an ENT specific review of systems is normal    PHYSICAL EXAMINATION:  GEN: no acute distress, normocephalic  EARS: bilateral auricles normally formed. Right external auditory canal is normal, there is a 1cm area of thickness of the skin of the conchal bowl corresponding to where the blister was.  Right TM is intact with mild global retraction, and middle ear is clear. Left external auditory canal is normal. Left TM is intact with mild global retraction, and middle ear is clear.   NECK: soft and supple. No lymphadenopathy or masses. Airway is midline.  PULM: breathing comfortably on room air, normal chest expansion with respiration    MEDICAL DECISION-MAKING: Angela is a 46yo M with a dermatologic issue on the conchal bowl of the ear. Appears eczematous or psoriasis-like. Will treat with topical steroids. Further treatment if necessary from Dermatology.

## 2021-06-17 NOTE — TELEPHONE ENCOUNTER
Central PA team  [phone]  Pool: HE PA MED ([id number])          PA has been initiated.       PA form completed and faxed insurance via Cover My Meds     Key:  [id number]     Medication:  MAXIDEX 0.1% SUSP OTIC    Insurance:  [employer]         Response will be received via fax and may take up to 5-10 business days depending on plan

## 2021-06-17 NOTE — TELEPHONE ENCOUNTER
Patient notified that a prior authorization has been started to try to cover the maxidex ear drops prescribed by Dr. Lin.    Gogo Cloud RN  [facility] ENT  [phone]

## 2021-06-17 NOTE — TELEPHONE ENCOUNTER
----- Message from America Lin MD sent at 5/20/2021 11:51 AM CDT -----  Oh right, sorry. There really isn't a substitute. Go for prior auth  ----- Message -----  From: Gogo Cloud RN  Sent: 5/20/2021  11:09 AM CDT  To: America Lin MD    This is what was sent yesterday: dexAMETHasone (MAXIDEX) 0.1 % ophthalmic suspension  ----- Message -----  From: America Lin MD  Sent: 5/20/2021  10:57 AM CDT  To: Gogo Cloud RN    Maxidex? I ordered floxin (no dex)  ----- Message -----  From: Gogo Cloud RN  Sent: 5/20/2021  10:48 AM CDT  To: America Lin MD    Maxidex is not covered by his insurance. Would you like me to send it for a prior auth (can take a few days) or is there a substitute?    Gogo Wilson

## 2021-06-17 NOTE — PROGRESS NOTES
"    Assessment & Plan     Kaveh was seen today for diabetes.    Diagnoses and all orders for this visit:    Type 2 diabetes mellitus without complication, without long-term current use of insulin (H)  -     Glycosylated Hemoglobin A1c  -     Microalbumin, Random Urine  -     Comprehensive Metabolic Panel    Mixed hyperlipidemia  -     Lipid Sharp FASTING    Infective otitis externa, right  -     neomycin-polymyxin-hydrocortisone (CORTISPORIN) 3.5-10,000-1 mg/mL-unit/mL-% otic suspension; 3 drops in right ear 3 times daily    Abnormal otoscopic exam of right ear  -     Ambulatory referral to ENT - [place]                 BMI:   Estimated body mass index is 37.59 kg/m  as calculated from the following:    Height as of this encounter: 5' 5.16\" (1.655 m).    Weight as of this encounter: 227 lb (103 kg).       Return in about 4 months (around 8/28/2021) for DM.    Saul Ribeiro MD  [facility]    Alesia Peterson is 45 y.o. and presents today for the following health issues   HPI     Fingersticks ~ 140.    Has drainage from right ear.      Current Outpatient Medications   Medication Sig Dispense Refill     atorvastatin (LIPITOR) 20 MG tablet TAKE 1 TABLET(20 MG) BY MOUTH DAILY 90 tablet 3     blood glucose test (CONTOUR NEXT TEST STRIPS) strips USE TO CHECK BLOOD SUGAR ONCE DAILY 150 strip 2     docusate sodium (COLACE) 100 MG capsule Take 1 capsule (100 mg total) by mouth 2 (two) times a day as needed for constipation. 60 capsule 3     dulaglutide (TRULICITY) 1.5 mg/0.5 mL PnIj Inject 1.5 mg under the skin every 7 days. 2 mL 11     flash glucose scanning reader (FREESTYLE LAWRENCE 14 DAY READER) Misc Use 1 Units As Directed every 8 (eight) hours. 1 each 0     flash glucose sensor (FREESTYLE LAWRENCE 14 DAY SENSOR) Kit Use 1 Units As Directed every 14 (fourteen) days. 6 kit 3     generic lancets (MICROLET LANCET) USE TO CHECK BLOOD SUGARS ONCE DAILY. 200 each 3     glipiZIDE (GLUCOTROL " "XL) 5 MG 24 hr tablet Take 1 tablet (5 mg total) by mouth daily with breakfast. 30 tablet 3     metFORMIN (GLUCOPHAGE-XR) 500 MG 24 hr tablet TAKE 4 TABLETS(2000 MG) BY MOUTH DAILY WITH BREAKFAST 360 tablet 2     polyethylene glycol (GLYCOLAX) 17 gram/dose powder Take 17 g by mouth daily. 235 g 2     neomycin-polymyxin-hydrocortisone (CORTISPORIN) 3.5-10,000-1 mg/mL-unit/mL-% otic suspension 3 drops in right ear 3 times daily 10 mL 1     No current facility-administered medications for this visit.          Review of Systems  No fever or cough.      Objective    /78   Pulse 72   Temp 97.9  F (36.6  C) (Oral)   Ht 5' 5.16\" (1.655 m)   Wt (!) 227 lb (103 kg)   SpO2 97%   BMI 37.59 kg/m    Body mass index is 37.59 kg/m .  Physical Exam  Heart normal  Lungs normal  Right ear has yellow drainage, and nodule of superior aspect of external auditory canal which might be a cholesteatoma.  Left canal normal.  Left TM has healed perforation.    A1c = 7.0              "

## 2021-06-17 NOTE — TELEPHONE ENCOUNTER
Prior Authorization Request  Who s requesting: America Lin   Pharmacy Name and Location: [address]   Medication Name: dexAMETHasone (MAXIDEX) 0.1 % ophthalmic suspension  Insurance Plan: [employer]   Insurance Member ID Number: [id number]   CoverMyMeds Key: N/A  Informed patient that prior authorizations can take up to 10 business days for response:   Yes  Okay to leave a detailed message: Yes

## 2021-06-18 NOTE — PROGRESS NOTES
"MT Follow Up Encounter    Assessment/Plan   1. Type 2 diabetes mellitus without complication, without long-term current use of insulin  Significant improvement with the addition of Trulicity and pioglitazone.  However,  Kaveh has been experiencing subjective hypoglycemia most days which has been causing him to eat a lot.  He has gained 13 pounds since our last visit 2 months ago.  This is unexpected given his new start of Trulicity.  I do have some concerns about the weight gain also potentially being due to the pioglitazone and not just the excessive eating.  We will stop glipizide today to prevent hypoglycemia and follow-up in 3 weeks.  If we are able to I would also like to reduce her stop the pioglitazone.  We also discussed starting a cholesterol medication, which Saw is open to.  We will discuss further at our next follow up.  -Stop glipizide XL 10 mg twice daily    Follow Up  Return in about 3 weeks (around 6/11/2018) for Diabetes, Dyslipidemia.        Subjective/Objective   Kaveh Peterson is a 42 y.o. male coming in for a follow up visit for Medication Therapy Management. He was referred to me from Primitivo Lance MD    Chief Complaint: Blood sugar is getting too low every day so I am eating a lot.  Personal Health Care Goals: Control blood sugar and lose weight.    Diabetes: Kaveh is taking Trulicity 1.5mg once weekly and pioglitazone 30mg once daily.  He has continued on metformin and glipizide XL 10mg BID.  About once per day, Saw feels that his blood sugar is too low and he has to eat.  He has been eating a lot - until \"his stomach feels like it is floating, and then he drinks a lot of juice\".  His weight is up 13 lbs since his last appointment two months.  Trace edema in both ankles.  His hands are feeling a little numb.  Lab Results   Component Value Date    HGBA1C 9.6 (H) 02/05/2018    HGBA1C 9.2 (H) 10/03/2017    HGBA1C 9.7 (H) 12/13/2016     Lab Results   Component Value Date    MICROALBUR 1.08 " 12/28/2017    LDLCALC 88 10/03/2017    CREATININE 1.10 10/03/2017     Medication Adherence/Access: no issues    PMH: reviewed in EPIC   Allergies/ADRs: reviewed in EPIC   Alcohol: reviewed in EPIC   Tobacco:   History   Smoking Status     Never Smoker   Smokeless Tobacco     Never Used     Today's Vitals:   Vitals:    05/21/18 1535   BP: 130/84   Pulse: 76   Weight: (!) 233 lb 1 oz (105.7 kg)     ----------------    Much or all of the text in this note was generated through the use of Dragon Dictate voice-to-text software. Errors in spelling or words which seem out of context are unintentional. Sound alike errors, in particular, may have escaped editing.    The patient declined an after visit summary    I spent 30 minutes with this patient today; Patient is not Medicare Part D. All changes were made via collaborative practice agreement with Primitivo Lance MD. A copy of the visit note was provided to the patient's provider.     Conor Carpenter, PharmD, BCACP  MTM Pharmacist at [facility]         Current Outpatient Prescriptions   Medication Sig Dispense Refill     CONTOUR NEXT STRIPS strips USE TO CHECK BLOOD SUGAR TWICE DAILY 150 strip 2     dulaglutide (TRULICITY) 1.5 mg/0.5 mL PnIj Inject 1.5 mg under the skin once a week. 12 Syringe 3     generic lancets (FINGERSTIX LANCETS) Use to check blood sugar twice daily. Dispense brand per patient's insurance at pharmacy discretion. 200 each 3     metFORMIN (GLUCOPHAGE-XR) 500 MG 24 hr tablet Take 4 tablets (2,000 mg total) by mouth daily with breakfast. 360 tablet 3     pioglitazone (ACTOS) 30 MG tablet Take 1 tablet (30 mg total) by mouth daily. 90 tablet 0     No current facility-administered medications for this visit.

## 2021-06-18 NOTE — PROGRESS NOTES
"MTM Follow Up Encounter  Assessment & Plan                                                     1. Type 2 diabetes mellitus without complication, without long-term current use of insulin  Uncontrolled to goal 7%. Saw is feeling much better since stopping glipizide, however he is still having occasional episodes which he is previously described as feeling hungry, which may have been hypoglycemia.  He has pretty limited blood sugar readings since stopping glipizide, however they do look a little concerning.  We agreed to monitor more closely for 6 months and then get an A1c at his next appointment to determine if current therapy is adequate.  -Glucose monitoring every morning  -A1c next appointment      Medication Adherence/Access: No concerns    Follow Up  Return in about 6 weeks (around 7/23/2018) for Diabetes.        Subjective & Objective                                                       Kaveh Peterson is a 43 y.o. male coming in for a follow up visit for Medication Therapy Management. He was referred to me from Primitivo Lance MD    Chief Complaint: Diabetes follow up    Diabetes: Saw is taking metformin 2000mg XR qam, pioglitazone 30mg, and Trulicity 1.5mg once weekly.  He has been a little constipated for a while.  Feeling better since stopping the glipizide, but still having occasional episodes of \"feeling hungry\".  Saw has not checked his blood sugar during these episodes.  He has checked his blood sugar three times since stopping glipizide three weeks ago.  AM fasting glucose: 130, 156, 210 mg/dl  Lab Results   Component Value Date    HGBA1C 9.6 (H) 02/05/2018    HGBA1C 9.2 (H) 10/03/2017    HGBA1C 9.7 (H) 12/13/2016     Lab Results   Component Value Date    MICROALBUR 1.08 12/28/2017    LDLCALC 88 10/03/2017    CREATININE 1.10 10/03/2017   PMH: reviewed in EPIC   Allergies/ADRs: reviewed in EPIC   Alcohol: reviewed in EPIC   Tobacco:   History   Smoking Status     Never Smoker   Smokeless Tobacco     " Never Used     Today's Vitals:   Vitals:    06/11/18 1140   Weight: (!) 230 lb (104.3 kg)     ----------------    Much or all of the text in this note was generated through the use of Dragon Dictate voice-to-text software. Errors in spelling or words which seem out of context are unintentional. Sound alike errors, in particular, may have escaped editing.    The patient declined an after visit summary    I spent 15 minutes with this patient today; Patient is not Medicare Part D. All changes were made via collaborative practice agreement with Primitivo Lance MD. A copy of the visit note was provided to the patient's provider.     Conor Carpenter, PharmD, BCACP  MTM Pharmacist at [facility]         Current Outpatient Prescriptions   Medication Sig Dispense Refill     CONTOUR NEXT STRIPS strips USE TO CHECK BLOOD SUGAR TWICE DAILY 150 strip 2     dulaglutide (TRULICITY) 1.5 mg/0.5 mL PnIj Inject 1.5 mg under the skin once a week. 12 Syringe 3     generic lancets (FINGERSTIX LANCETS) Use to check blood sugar twice daily. Dispense brand per patient's insurance at pharmacy discretion. 200 each 3     metFORMIN (GLUCOPHAGE-XR) 500 MG 24 hr tablet Take 4 tablets (2,000 mg total) by mouth daily with breakfast. 360 tablet 3     pioglitazone (ACTOS) 30 MG tablet Take 1 tablet (30 mg total) by mouth daily. 90 tablet 0     No current facility-administered medications for this visit.

## 2021-06-19 NOTE — PROGRESS NOTES
MTM Follow Up Encounter  Assessment & Plan                                                     1. Type 2 diabetes mellitus without complication, without long-term current use of insulin (H)  Although a.m. fasting blood sugars are still mildly elevated, it sounds as though overall control is excellent given recent outside A1c. Saw agrees to sign a release of information to have his lab work from the dentist sent over.  We also discussed the use of cholesterol medications for patients with diabetes and the benefits of doing so.  He agrees to start on a moderate intensity statin today.  His 10 year ASCVD risk is 3%, aspirin is not necessary at this point.  -Start atorvastatin 20 mg once daily    2. Dyslipidemia  Baseline cholesterol is relatively low, so we will start him on a lower dose of atorvastatin.  Using 20 mg instead of 10 mg in order to help reduce triglycerides slightly as well.      Follow Up  Three months for lipid cascade, A1c    Subjective & Objective                                                       Saw OREN Peterson is a 43 y.o. male coming in for a follow up visit for Medication Therapy Management. He was referred to me from Primitivo Lance MD    Chief Complaint: Diabetes follow-up and cholesterol    Medication Adherence/Access: No concerns    Diabetes: Metformin XR 2000mg once daily, Trulicity 1.5mg weekly  Feeling much better since discontinuing glipizide.  Saw had an A1c done at dentist office one week ago which came back at 5.7.  Am fastin-150 mg/dL.    Lab Results   Component Value Date    HGBA1C 9.6 (H) 2018    HGBA1C 9.2 (H) 10/03/2017    HGBA1C 9.7 (H) 2016     Lab Results   Component Value Date    MICROALBUR 1.08 2017    LDLCALC 88 10/03/2017    CREATININE 1.10 10/03/2017       Dyslipidemia:   Lab Results   Component Value Date    CHOL 154 10/03/2017    CHOL 147 2016    CHOL 141 2014     Lab Results   Component Value Date    HDL 32 (L) 10/03/2017    HDL 26  (L) 12/13/2016    HDL 26 (L) 04/04/2014     Lab Results   Component Value Date    LDLCALC 88 10/03/2017    LDLCALC 48 12/13/2016    LDLCALC 63 04/04/2014     Lab Results   Component Value Date    TRIG 172 (H) 10/03/2017    TRIG 364 (H) 12/13/2016    TRIG 261 (H) 04/04/2014     No components found for: CHOLHDL    PMH: reviewed in EPIC   Allergies/ADRs: reviewed in EPIC   Alcohol: reviewed in EPIC   Tobacco:   History   Smoking Status     Never Smoker   Smokeless Tobacco     Never Used     Today's Vitals: There were no vitals filed for this visit.  ----------------    Much or all of the text in this note was generated through the use of Dragon Dictate voice-to-text software. Errors in spelling or words which seem out of context are unintentional. Sound alike errors, in particular, may have escaped editing.    The patient declined an after visit summary    I spent 15 minutes with this patient today;  All changes were made via collaborative practice agreement with Primitivo Lance MD. A copy of the visit note was provided to the patient's provider.     Conor Carpenter, PharmD, BCACP  MTM Pharmacist at [facility]     Current Outpatient Prescriptions   Medication Sig Dispense Refill     CONTOUR NEXT STRIPS strips USE TO CHECK BLOOD SUGAR TWICE DAILY 150 strip 2     dulaglutide (TRULICITY) 1.5 mg/0.5 mL PnIj Inject 1.5 mg under the skin once a week. 12 Syringe 3     generic lancets (FINGERSTIX LANCETS) Use to check blood sugar twice daily. Dispense brand per patient's insurance at pharmacy discretion. 200 each 3     metFORMIN (GLUCOPHAGE-XR) 500 MG 24 hr tablet Take 4 tablets (2,000 mg total) by mouth daily with breakfast. 360 tablet 3     pioglitazone (ACTOS) 30 MG tablet Take 1 tablet (30 mg total) by mouth daily. 90 tablet 0     pioglitazone (ACTOS) 30 MG tablet TAKE 1 TABLET(30 MG) BY MOUTH DAILY 30 tablet 0     No current facility-administered medications for this visit.

## 2021-06-20 NOTE — PROGRESS NOTES
Subjective:   Kaveh Peterson presents today with 2 concerns.  He has been having swelling below the right ear and just posterior and inferior to the angle of the mandible.  He denies injury to this area.  It is swollen and somewhat painful.  He states he has had this in the past.  Antibiotics in the past has improved symptoms.  He has minimal pain with chewing.  He denies fevers, sweats or chills.  He also is here because of a rash on his left leg.  He has an open sore in the pretibial area.  It has been quite pruritic and he has been scratching it until it has broken open.  He is using a cream that the dermatologist gave him for itching.  There is been minimal drainage from the wound.  He denies active bleeding.  He has not been exposed to any new lotions, soaps or detergents of any type.      Objective:  HEENT: Both TMs are gray.  Conjunctiva clear.  Sinuses nontender.  Pharynx is clear.  Neck: There is a 3 cm swelling in the right infra-auricular area.  This is slightly tender.  No significant erythema noted in the area.  Patient has full range of motion of mandible.  He can open his mouth fully.  This does not cause much pain.  No other lymphadenopathy present.  No thyromegaly noted.  Lungs: Lungs are totally clear.  Patient is in no respiratory distress.  Dermatologic: There are open sores noted in the left pretibial area.  There are areas of increased pigmentation from old lesions which have healed.  There are scratch marks noted over the upper pretibial area as well.  Minimal exudate is present.      Assessment:  1.  Chronic dermatitis left leg.  Worsened by neurodermatitis  2.  Parotiditis.  Recurrent      Plan:  Patient will be started on Augmentin 875 mg twice daily.  He will continue the cream that the dermatologist gave him.  He will try to keep the open area covered so he does not scratch this at nighttime.  He can do this with a gauze and Ace wrap dressing.  Augmentin should help heal the open sores on  his leg as well.  He can soak the leg as needed.  Follow-up here if symptoms would persist.

## 2021-06-20 NOTE — LETTER
Letter by Saul Riberio MD at      Author: Saul Ribeiro MD Service: -- Author Type: --    Filed:  Encounter Date: 2/15/2020 Status: (Other)         Saw S Nicholas  [address]  [place] [address]             February 15, 2020         Hi Angela,    Below are the results from your recent visit:  Your LDL cholesterol is higher.  It can improve with decreased intake of high-cholesterol foods, such as beef, mohan, sausage, eggs, cheese, butter, ice cream, shellfish (shrimp, crab or lobster) and chicken skin.  Are you taking atorvastatin?  Perhaps your keto diet makes your LDL higher.      Resulted Orders   Comprehensive Metabolic Panel   Result Value Ref Range    Sodium 140 136 - 145 mmol/L    Potassium 4.2 3.5 - 5.0 mmol/L    Chloride 105 98 - 107 mmol/L    CO2 27 22 - 31 mmol/L    Anion Gap, Calculation 8 5 - 18 mmol/L    Glucose 185 (H) 70 - 125 mg/dL    BUN 17 8 - 22 mg/dL    Creatinine 0.94 0.70 - 1.30 mg/dL    GFR MDRD Af Amer >60 >60 mL/min/1.73m2    GFR MDRD Non Af Amer >60 >60 mL/min/1.73m2    Bilirubin, Total 0.4 0.0 - 1.0 mg/dL    Calcium 9.6 8.5 - 10.5 mg/dL    Protein, Total 7.6 6.0 - 8.0 g/dL    Albumin 4.3 3.5 - 5.0 g/dL    Alkaline Phosphatase 83 45 - 120 U/L    AST 23 0 - 40 U/L    ALT 49 (H) 0 - 45 U/L    Narrative    Fasting Glucose reference range is 70-99 mg/dL per  American Diabetes Association (ADA) guidelines.   Lipid [place] FASTING   Result Value Ref Range    Cholesterol 170 <=199 mg/dL    Triglycerides 134 <=149 mg/dL    HDL Cholesterol 31 (L) >=40 mg/dL    LDL Calculated 112 <=129 mg/dL    Patient Fasting > 8hrs? Yes    Microalbumin, Random Urine   Result Value Ref Range    Microalbumin, Random Urine 9.85 (H) 0.00 - 1.99 mg/dL    Creatinine, Urine 107.2 mg/dL    Microalbumin/Creatinine Ratio Random Urine 91.9 (H) <=19.9 mg/g    Narrative    Microalbumin, Random Urine  <2.0 mg/dL . . . . . . . . Normal  3.0-30.0 mg/dL . . . . . . Microalbuminuria  >30.0 mg/dL . . . . .  .  . Clinical Proteinuria    Microalbumin/Creatinine Ratio, Random Urine  <20 mg/g . . . . .. . . . Normal   mg/g . . . . . . . Microalbuminuria  >300 mg/g . . . . . . . . Clinical Proteinuria       Glycosylated Hemoglobin A1c   Result Value Ref Range    Hemoglobin A1c 6.7 (H) 3.5 - 6.0 %         Please call with questions or contact us using [employer].    Sincerely,        Electronically signed by Saul Ribeiro MD

## 2021-06-21 NOTE — PROGRESS NOTES
MTM Follow Up Encounter  Assessment & Plan                                                       1. Type 2 diabetes mellitus with complication, without long-term current use of insulin (H)  Stable.  A1c is at goal less than 7%.  Blood sugars much improved since last MTM visit.  Discussed with patient impact of carbohydrates (cookies, rice, etc.) on blood sugar and to limit where able.  - Glycosylated Hemoglobin A1C    Future considerations: yearly microalbumin at follow-up with Dr. Lance.    2. Dyslipidemia  Stable.  Patient would benefit from reassessment of yearly lipids at follow-up with Dr. Lance.    Follow Up  Return in about 6 months (around 2019). Ensure to schedule follow up with Dr. Lance.     Subjective & Objective                                                       Saw OREN Peterson is a 43 y.o. male coming in for a follow up visit for Medication Therapy Management. He was referred to MTM from Primitivo Lance MD    Chief Complaint: Diabetes follow-up.     Medication Adherence/Access: Patient has been taking medications as prescribed.     Diabetes:  Pt currently taking metformin ER 2000 mg daily, pioglitazone 30 mg daily, Trulicity 1.5 mg weekly. patient is not currently experiencing side effects. Patient feels like the medication has been working to assist with decreasing hunger throughout day, eating smaller portions.   SMBG: once daily , sometimes twice daily Ranges (patient reported) : AM fastin-150 mg/dL. Does not have bedtime dosing numbers, has been a while since tested at bedtime.   Patient is not experiencing hypoglycemia   Recent symptoms of high blood sugar? None  Eye exam: up to date   Foot exam: up to date   ACEi/ARB:  No.   Diet/Exercise: Breakfast: Doesn't eat breakfast, coffee and/or cookie; Lunch: rice, meats and vegetables, Dinner: rice, hernandez, meat, vegetables. Patient states he eats 1.5 to 2 cups of rice at each meal.    Lab Results   Component Value Date    HGBA1C 6.6  (H) 10/26/2018    HGBA1C 9.6 (H) 02/05/2018    HGBA1C 9.2 (H) 10/03/2017     Lab Results   Component Value Date    MICROALBUR 1.08 12/28/2017    LDLCALC 88 10/03/2017    CREATININE 1.10 10/03/2017     Hyperlipidemia: Current therapy includes atorvastatin 20 mg daily, started last MTM visit.  Pt reports no significant myalgias or other side effects.     PMH: reviewed in EPIC   Allergies/ADRs: reviewed in EPIC   Alcohol: reviewed in EPIC   Tobacco:   History   Smoking Status     Never Smoker   Smokeless Tobacco     Never Used     Today's Vitals:   Vitals:    10/26/18 1616   BP: 130/72   Weight: (!) 228 lb 4.8 oz (103.6 kg)     ----------------    Much or all of the text in this note was generated through the use of Dragon Dictate voice-to-text software. Errors in spelling or words which seem out of context are unintentional. Sound alike errors, in particular, may have escaped editing.    The patient declined an after visit summary    I spent 15 minutes with this patient today;   All changes were made via collaborative practice agreement with Primitivo Lance MD. A copy of the visit note was provided to the patient's provider.     Michelle Park, PharmD   Medication Therapy Management Pharmacist   [facility]     Current Outpatient Prescriptions   Medication Sig Dispense Refill     atorvastatin (LIPITOR) 20 MG tablet Take 1 tablet (20 mg total) by mouth daily. 90 tablet 3     CONTOUR NEXT STRIPS strips USE TO CHECK BLOOD SUGAR TWICE DAILY 150 strip 2     dulaglutide (TRULICITY) 1.5 mg/0.5 mL PnIj Inject 1.5 mg under the skin once a week. 12 Syringe 3     generic lancets (FINGERSTIX LANCETS) Use to check blood sugar twice daily. Dispense brand per patient's insurance at pharmacy discretion. 200 each 3     metFORMIN (GLUCOPHAGE-XR) 500 MG 24 hr tablet Take 4 tablets (2,000 mg total) by mouth daily with breakfast. 360 tablet 3     pioglitazone (ACTOS) 30 MG tablet Take 1 tablet (30 mg total) by mouth daily.  90 tablet 3     pioglitazone (ACTOS) 30 MG tablet TAKE 1 TABLET(30 MG) BY MOUTH DAILY 30 tablet 0     No current facility-administered medications for this visit.

## 2021-06-23 NOTE — TELEPHONE ENCOUNTER
RN cannot approve Refill Request    RN can NOT refill this medication Protocol failed and NO refill given. Last office visit: Visit date not found Last Physical: Visit date not found Last MTM visit: 3/21/2018 Conor Carpenter, Angie Last visit same specialty: Visit date not found.  Next visit within 3 mo: Visit date not found  Next physical within 3 mo: Visit date not found      Peg Cabello, [facility] Triage/Med Refill 1/10/2019    Requested Prescriptions   Pending Prescriptions Disp Refills     pioglitazone (ACTOS) 30 MG tablet [Pharmacy Med Name: PIOGLITAZONE 30MG TABLETS] 30 tablet 0     Sig: TAKE 1 TABLET(30 MG) BY MOUTH DAILY    Pioglitazone Protocol Failed - 1/8/2019  8:58 PM       Failed - LFT or AST or ALT in last 12 months    Albumin   Date Value Ref Range Status   03/05/2017 3.9 3.5 - 5.0 g/dL Final     Bilirubin, Total   Date Value Ref Range Status   03/05/2017 0.7 0.0 - 1.0 mg/dL Final     Alkaline Phosphatase   Date Value Ref Range Status   03/05/2017 96 45 - 120 U/L Final     AST   Date Value Ref Range Status   03/05/2017 50 (H) 0 - 40 U/L Final     ALT   Date Value Ref Range Status   03/05/2017 126 (H) 0 - 45 U/L Final     Protein, Total   Date Value Ref Range Status   03/05/2017 7.9 6.0 - 8.0 g/dL Final               Failed - Microalbumin in last year    Microalbumin, Random Urine   Date Value Ref Range Status   12/28/2017 1.08 0.00 - 1.99 mg/dL Final                 Failed - Serum creatinine in last year    Creatinine   Date Value Ref Range Status   10/03/2017 1.10 0.70 - 1.30 mg/dL Final            Passed - Blood pressure in last 12 months    BP Readings from Last 1 Encounters:   10/26/18 130/72            Passed - Visit with PCP or prescribing provider visit in last 6 months     Last office visit with prescriber/PCP: Visit date not found OR same dept: Visit date not found OR same specialty: Visit date not found Last physical: Visit date not found Last MTM visit: 3/21/2018 Pauline  Angie Perdomo         Next appt within 3 mo: Visit date not found  Next physical within 3 mo: Visit date not found  Prescriber OR PCP: Arslan Erickson MD  Last diagnosis associated with med order: 1. Type 2 diabetes mellitus without complication, without long-term current use of insulin (H)  - pioglitazone (ACTOS) 30 MG tablet [Pharmacy Med Name: PIOGLITAZONE 30MG TABLETS]; TAKE 1 TABLET(30 MG) BY MOUTH DAILY  Dispense: 30 tablet; Refill: 0     If protocol passes may refill for 12 months if within 3 months of last provider visit (or a total of 15 months).          Passed - A1C in last 6 months    Hemoglobin A1c   Date Value Ref Range Status   10/26/2018 6.6 (H) 3.5 - 6.0 % Final

## 2021-06-23 NOTE — PROGRESS NOTES
MTM Follow Up Encounter  Assessment & Plan                                                     Due to patient arriving to appointment 20 minutes late, unable to fully assess disease states today in appropriate depth.  Of note, patient has yet to establish with new PCP(Dr. Ribeiro) since previous physician retired.    1. Throat pain  Needs further assessment.  Informed patient to remain hydrated and schedule appointment if pain continues without resolution.    2. Type 2 diabetes mellitus without complication  Needs improvement.  A1c increased since previous, now not meeting goal of less than 7%.  Patient currently taking metformin XR 2000 mg daily, Trulicity 1.5 mg once weekly, and pioglitazone 30 mg once daily.  Despite use of Trulicity, patient continues to gain about 10 pounds since July 2018, concerned that this could potentially be related to daily pioglitazone use.  Patient previously tried glipizide which caused hypoglycemia (Dose used was XL 10 mg two times a day) though this has increased risk of weight gain.  Another option is SGLT2 inhibitors to aid in weight loss.  Patient willing to start additional meds if needed.  -Per discussion with future primary (Hegland) patient to start glipizide XL 5 mg once daily with a meal.  -Glycosylated Hemoglobin A1C; Today    3. Dyslipidemia  Stable.  Lipid panel last assessed in October 2017. Appropriate to reassess at this time, especially since starting atorvastatin in the last year.  Currently taking moderate intensity atorvastatin 20 mg once daily.    Future consideration: Reassess lipid panel at next visit      Follow Up  Return in about 2 weeks (around 1/31/2019) for Medication Review.   4/15 aidan to establish care     Subjective & Objective                                                       Saw OREN Peterson is a 43 y.o. male coming in for a follow up visit for Medication Therapy Management. He was referred to me from Primitivo Lance MD previously seen by  Delon -soon to be establishing care with Dr. Ribeiro.    Chief Complaint: Sore throat, had flu shot already, no fever, no cough    Medication Adherence/Access: Self managed    Sore throat: Patient complains of continued sore throat for the past week.  Expresses no fever, no cough, or other symptoms.  Patient states he already received his flu shot this year.    Diabetes:  Pt currently taking metformin ER 2000 mg daily, pioglitazone 30 mg daily, Trulicity 1.5 mg weekly. patient is not currently experiencing side effects. Patient is not experiencing hypoglycemia.   Self monitored blood glucose once daily fasting(per glucometer):  Date FBG AM   1/16 191   1/15 274   1/12 218   1/11 180   1/10 237   1/9 247   1/8 236     Wt Readings from Last 3 Encounters:   10/26/18 (!) 228 lb 4.8 oz (103.6 kg)   08/27/18 (!) 229 lb (103.9 kg)   07/23/18 (!) 223 lb 8 oz (101.4 kg)     Recent symptoms of high blood sugar? None  Eye exam: up to date   Foot exam: up to date   ACEi/ARB:  No.   Lab Results   Component Value Date    HGBA1C 7.6 (H) 01/16/2019    HGBA1C 6.6 (H) 10/26/2018    HGBA1C 9.6 (H) 02/05/2018     Lab Results   Component Value Date    MICROALBUR 1.08 12/28/2017    LDLCALC 88 10/03/2017    CREATININE 1.10 10/03/2017     Hyperlipidemia: Current therapy includes atorvastatin 20 mg daily. Pt reports no significant myalgias or other side effects.   Lab Results   Component Value Date    CHOL 154 10/03/2017    CHOL 147 12/13/2016    CHOL 141 04/04/2014     Lab Results   Component Value Date    HDL 32 (L) 10/03/2017    HDL 26 (L) 12/13/2016    HDL 26 (L) 04/04/2014     Lab Results   Component Value Date    LDLCALC 88 10/03/2017    LDLCALC 48 12/13/2016    LDLCALC 63 04/04/2014     Lab Results   Component Value Date    TRIG 172 (H) 10/03/2017    TRIG 364 (H) 12/13/2016    TRIG 261 (H) 04/04/2014     PMH: reviewed in EPIC   Allergies/ADRs: reviewed in EPIC   Alcohol: reviewed in EPIC   Tobacco:   Social History     Tobacco Use    Smoking Status Never Smoker   Smokeless Tobacco Never Used     Today's Vitals:   Vitals:    01/17/19 1454   Weight: (!) 231 lb (104.8 kg)   ----------------    Much or all of the text in this note was generated through the use of Dragon Dictate voice-to-text software. Errors in spelling or words which seem out of context are unintentional. Sound alike errors, in particular, may have escaped editing.    The patient declined an after visit summary    I spent 30 minutes with this patient today;   I offer these suggestions for consideration by the PCP. A copy of the visit note was provided to the patient's provider.     Saba Gallegos, PharmD  Medication Therapy Management Pharmacist  [facility]       Current Outpatient Medications   Medication Sig Dispense Refill     atorvastatin (LIPITOR) 20 MG tablet Take 1 tablet (20 mg total) by mouth daily. 90 tablet 3     CONTOUR NEXT STRIPS strips USE TO CHECK BLOOD SUGAR TWICE DAILY 150 strip 2     dulaglutide (TRULICITY) 1.5 mg/0.5 mL PnIj Inject 1.5 mg under the skin once a week. 12 Syringe 3     generic lancets (FINGERSTIX LANCETS) Use to check blood sugar twice daily. Dispense brand per patient's insurance at pharmacy discretion. 200 each 3     metFORMIN (GLUCOPHAGE-XR) 500 MG 24 hr tablet Take 4 tablets (2,000 mg total) by mouth daily with breakfast. 360 tablet 3     pioglitazone (ACTOS) 30 MG tablet Take 1 tablet (30 mg total) by mouth daily. 90 tablet 3     pioglitazone (ACTOS) 30 MG tablet TAKE 1 TABLET(30 MG) BY MOUTH DAILY 90 tablet 1     No current facility-administered medications for this visit.

## 2021-06-23 NOTE — TELEPHONE ENCOUNTER
Pt will be due for A1C check in April. Please call to have him schedule a visit to establish care with a new provider at that time. Thanks.     Vivian Russell MD

## 2021-06-23 NOTE — TELEPHONE ENCOUNTER
MTM Follow Up Encounter  Assessment & Plan                                                       1. Type 2 diabetes mellitus without complication, without long-term current use of insulin (H)  Improving.  Unable to definitively assess, but per patient report fasting blood sugars have been much better controlled between 130 and 150 versus high 100s to high 200s from a few weeks ago.  Started taking glipizide XL 5 mg once daily about 2 weeks ago.  At the same time, patient stopped pioglitazone due to lack of refills at the pharmacy- despite this, blood sugars remain controlled.  With patient's increased concern for his weight gain since July, appropriate to discontinue pioglitazone at this time and reassess blood sugars at next visit.  -Patient to stop pioglitazone  -Patient to continue checking fasting blood glucose once daily and Trulicity 1.5 mg once weekly, glipizide XL 5 mg daily, and metformin XR 2000 mg daily    Consideration: Reassess microalbuminuria and BMP at next visit    2. Dyslipidemia  Needs further assessment.  Last lipid panel from October 2017.  Patient agreeable to test lipid cascade at follow-up visit in 1 month.    Future consideration: Reassess lipid panel at next visit    Follow Up  Return in about 1 month (around 2/28/2019) for Medication Review.  Draw lipid panel, BMP, and microalbumin and meet with MTM after.     Subjective & Objective                                                       Saw OREN Peterson is a 43 y.o. male called for a follow up visit for Medication Therapy Management. He was referred to me from previous visit [facility] pharmacist. Patient currently waiting to establish care with new primary care provider (set for 4/15/19).    Chief Complaint: I have not been able to refill my pioglitazone - pharmacy is out of refills    Medication Adherence/Access: Self managed, on concerns for medication non-adherence at this time    Diabetes:  Pt currently taking Trulicity 1.5 mg once weekly,  glipizide XL 5 mg once daily with breakfast, and metformin 2,000 mg once daily with breakfast. He has not been taking pioglitazone - last dose was about 1-2 weeks ago because the pharmacy did not have refills at that time. Despite not taking pioglitazone, his blood sugars have been pretty well managed.   patient is not currently experiencing side effects.   SMBG: once daily    Ranges  (patient reported) : *Patient does not have glucometer with him today, therefore unable to obtain exact readings. States that the average has been 130-150 mg/dL *  Patient is experiencing hypoglycemia. one expereince in last 2 weeks at 101 mg/dL - other than this one episode, no other hypoglycemia - patient aware of how to correct for hypoglycemia.    Recent symptoms of high blood sugar? None  *Patient Reported*  Date FBG AM   1/31 159   1/30 129     Previous Visit  Date FBG AM   1/16 191   1/15 274   1/12 218   1/11 180   1/10 237   1/9 247   1/8 236     Lab Results   Component Value Date    HGBA1C 7.6 (H) 01/16/2019    HGBA1C 6.6 (H) 10/26/2018    HGBA1C 9.6 (H) 02/05/2018     Lab Results   Component Value Date    MICROALBUR 1.08 12/28/2017    LDLCALC 88 10/03/2017    CREATININE 1.10 10/03/2017     Wt Readings from Last 3 Encounters:   01/17/19 (!) 231 lb (104.8 kg)   10/26/18 (!) 228 lb 4.8 oz (103.6 kg)   08/27/18 (!) 229 lb (103.9 kg)     Hypercholestremia: Atorvastatin 20 mg daily. No concerns with this medication at this time, denies side effects.   Lab Results   Component Value Date    CHOL 154 10/03/2017    CHOL 147 12/13/2016    CHOL 141 04/04/2014     Lab Results   Component Value Date    HDL 32 (L) 10/03/2017    HDL 26 (L) 12/13/2016    HDL 26 (L) 04/04/2014     Lab Results   Component Value Date    LDLCALC 88 10/03/2017    LDLCALC 48 12/13/2016    LDLCALC 63 04/04/2014     Lab Results   Component Value Date    TRIG 172 (H) 10/03/2017    TRIG 364 (H) 12/13/2016    TRIG 261 (H) 04/04/2014       PMH: reviewed in EPIC    Allergies/ADRs: reviewed in EPIC   Alcohol: reviewed in EPIC   Tobacco:   Social History     Tobacco Use   Smoking Status Never Smoker   Smokeless Tobacco Never Used   ----------------    Much or all of the text in this note was generated through the use of Dragon Dictate voice-to-text software. Errors in spelling or words which seem out of context are unintentional. Sound alike errors, in particular, may have escaped editing.    The patient declined an after visit summary    I spent 30 minutes with this patient today;   All changes were made via collaborative practice agreement with Dr. Ribeiro. A copy of the visit note was provided to the patient's provider.     Saba Gallegos, PharmD  Medication Therapy Management Pharmacist  [facility]       Current Outpatient Medications   Medication Sig Dispense Refill     atorvastatin (LIPITOR) 20 MG tablet Take 1 tablet (20 mg total) by mouth daily. 90 tablet 3     CONTOUR NEXT STRIPS strips USE TO CHECK BLOOD SUGAR TWICE DAILY 150 strip 2     dulaglutide (TRULICITY) 1.5 mg/0.5 mL PnIj Inject 1.5 mg under the skin once a week. 12 Syringe 3     generic lancets (FINGERSTIX LANCETS) Use to check blood sugar twice daily. Dispense brand per patient's insurance at pharmacy discretion. 200 each 3     glipiZIDE (GLUCOTROL XL) 5 MG 24 hr tablet Take 1 tablet (5 mg total) by mouth daily with breakfast. 90 tablet 3     metFORMIN (GLUCOPHAGE-XR) 500 MG 24 hr tablet Take 4 tablets (2,000 mg total) by mouth daily with breakfast. 360 tablet 3     No current facility-administered medications for this visit.

## 2021-06-24 NOTE — TELEPHONE ENCOUNTER
Patient did not have time today for a full appointment.  Therefore, will get labs drawn today and set up a follow-up appointment for discussion around these labs and his medications.    Lab draw today: BMP, microalbumin, fasting lipid cascade    Follow-up: Patient to follow-up in 1-2 weeks with MTM pharmacist    Saba Gallegos, PharmD  Medication Therapy Management Pharmacist  [facility]

## 2021-06-25 ENCOUNTER — COMMUNICATION - HEALTHEAST (OUTPATIENT)
Dept: FAMILY MEDICINE | Facility: CLINIC | Age: 46
End: 2021-06-25

## 2021-06-25 DIAGNOSIS — E78.5 DYSLIPIDEMIA: ICD-10-CM

## 2021-06-25 DIAGNOSIS — E11.9 TYPE 2 DIABETES MELLITUS WITHOUT COMPLICATION, WITHOUT LONG-TERM CURRENT USE OF INSULIN (H): ICD-10-CM

## 2021-06-25 RX ORDER — ATORVASTATIN CALCIUM 20 MG/1
TABLET, FILM COATED ORAL
Qty: 90 TABLET | Refills: 1 | Status: SHIPPED | OUTPATIENT
Start: 2021-06-25 | End: 2022-02-11

## 2021-06-25 NOTE — PROGRESS NOTES
ASSESSMENT AND PLAN:  1. Type 2 diabetes mellitus without complication, without long-term current use of insulin (H) she is measuring his blood sugars average values below 120.  Taking medications faithfully no appreciable rise in morning glucose values with the prednisone.         2. Cough patient does have a postural cough which is related to his position when he sleeping he does not a cough during the day the albuterol is not helping and have asked him to stop it.  I have also asked him to stop the Singulair I am prescribing cetirizine. cetirizine (ZYRTEC) 10 MG tablet   3. Post-nasal drip postnasal drip noted no medical history significant for allergies noted cetirizine (ZYRTEC) 10 MG tablet         No orders of the defined types were placed in this encounter.    There are no discontinued medications.    No Follow-up on file.    CHIEF COMPLAINT:  Chief Complaint   Patient presents with     Cough       HISTORY OF PRESENT ILLNESS:  Kaveh is a 43 y.o. male with diabetes mellitus type 2 who presents to the clinic today for a cough. He was initially seen in clinic on 03/14/2019 at which time he was diagnosed with asthma exacerbation. The patient denies prior history of asthma, and at the time, was using an albuterol inhaler he was given in the past for acute bronchitis. He was started on oral steroid burst and taper as well as Singulair. The albuterol inhaler has not helped at all. His cough continues to persist. Nasal drainage is denied. Kaveh is taking some honey to help with his cough. He states that his blood sugars have been okay, and his fasting blood sugar was 105 this morning. His last hemoglobin A1c was 7.6 back in January. The patient has not had any difficulty swallowing his medications. He does not smoke, and works outside of the home, not involving any fine dust.     REVIEW OF SYSTEMS:   ENT: No nasal drainage.  Respiratory: Cough.    All other systems are negative.    PFSH:  Employed. No smoking tobacco  "use. Reviewed as below.     TOBACCO USE:  Social History     Tobacco Use   Smoking Status Never Smoker   Smokeless Tobacco Never Used       VITALS:  Vitals:    03/21/19 1402   BP: 134/84   Patient Site: Left Arm   Patient Position: Sitting   Cuff Size: Adult Large   Pulse: 78   Resp: 20   Temp: 97.7  F (36.5  C)   TempSrc: Oral   SpO2: 94%   Weight: (!) 229 lb (103.9 kg)   Height: 5' 6\" (1.676 m)     Wt Readings from Last 3 Encounters:   03/21/19 (!) 229 lb (103.9 kg)   03/14/19 (!) 228 lb 9.6 oz (103.7 kg)   01/17/19 (!) 231 lb (104.8 kg)     Body mass index is 36.96 kg/m .    PHYSICAL EXAM:  General: Alert, cooperative, no distress, appears stated age  Head: Normocephalic, without obvious abnormality, atraumatic  Eyes: PERRL, conjunctiva/cornea clear, EOM's intact  Ears: Normal TM's and external ear canals, both ears  Nose: Left inferior turbinate hypertrophy, right is normal; nares normal, septum midline, mucosa normal, no drainage or sinus tenderness  Throat: Lips, mucosa, and tongue normal; teeth and gums normal  Lungs: Cough noted with laying down, otherwise clear to auscultation bilaterally, respirations unlabored  Heart: Regular rate and rhythm, S1 and S2 normal, no murmur, rub, or gallop  Neurologic:  A & O x 3.  No tremor, no focal findings.  Normal gait.     DATA REVIEWED:  Additional History from Old Records Summarized (2): Reviewed 03/14/2019 note regarding evaluation for cough.  Decision to Obtain Records (1): none  Radiology Tests Summarized or Ordered (1): none  Labs Reviewed or Ordered (1): Hemoglobin A1c 01/16/2019 was 7.6.  Medicine Test Summarized or Ordered (1): none  Independent Review of EKG or X-RAY(2 each): none    Ivanna STEIN, am scribing for and in the presence of, Dr. Merlos.    Dr. Gaurang STEIN, personally performed the services described in this documentation, as scribed by Ivanna Lima in my presence, and it is both accurate and complete.      MEDICATIONS:  Current Outpatient Medications "   Medication Sig Dispense Refill     albuterol (PROAIR HFA;PROVENTIL HFA;VENTOLIN HFA) 90 mcg/actuation inhaler Inhale 2 puffs every 6 (six) hours as needed for wheezing. 1 each 1     atorvastatin (LIPITOR) 20 MG tablet Take 1 tablet (20 mg total) by mouth daily. 90 tablet 3     CONTOUR NEXT STRIPS strips USE TO CHECK BLOOD SUGAR TWICE DAILY 150 strip 2     dulaglutide (TRULICITY) 1.5 mg/0.5 mL PnIj Inject 1.5 mg under the skin once a week. 12 Syringe 3     generic lancets (FINGERSTIX LANCETS) Use to check blood sugar twice daily. Dispense brand per patient's insurance at pharmacy discretion. 200 each 3     glipiZIDE (GLUCOTROL XL) 5 MG 24 hr tablet Take 1 tablet (5 mg total) by mouth daily with breakfast. 90 tablet 3     metFORMIN (GLUCOPHAGE-XR) 500 MG 24 hr tablet Take 4 tablets (2,000 mg total) by mouth daily with breakfast. 360 tablet 3     montelukast (SINGULAIR) 10 mg tablet Take 1 tablet (10 mg total) by mouth daily. 30 tablet 2     cetirizine (ZYRTEC) 10 MG tablet Take 1 tablet (10 mg total) by mouth daily. 30 tablet 2     predniSONE (DELTASONE) 10 mg tablet Take 10 mg by mouth daily Take 3 tablets by mouth daily for 2 days, then take 2 tablets for 2 days, then 1 tablet for 2 days, then stop.. 12 tablet 0     No current facility-administered medications for this visit.        Total Data Points: 3    Please note that this clinical encounter uses voice recognition software, there may be typographical errors present

## 2021-06-25 NOTE — TELEPHONE ENCOUNTER
Talked to wife, she will have pt call when he is able, he is currently at work and does not get of until 5:30 pm.     ----- Message from Saba Gallegos, PharmD sent at 3/21/2019  7:55 AM CDT -----  Hello,    Can we please reach out to reschedule a f/u appt with me? Would like to visit with pt to discuss recent labs and adjustments that are needed to medications.     Thank you  Fran

## 2021-06-25 NOTE — PATIENT INSTRUCTIONS - HE
Follow-up in ER if symptoms worsen, follow-up with your regular primary care if not improving.  May call for rescue antibiotic if you run a fever.

## 2021-06-25 NOTE — PROGRESS NOTES
Clinic Note   SUBJECTIVE:   Chief Complaint   Patient presents with     Cough     x a few days, non productive   Saw S Nicholas  Is seen for worsening asthma since cough started about a week ago.  He has episodes of spasm after cough.  He has been using his albuterol with minimal improvement, up to 3 times a day.  He denies sore throat, fever, nausea, vomiting, ear pain, headache.  His son is also ill with a virus.  In the past he has needed prednisone to improve his asthma symptoms when he had a virus.  He has had to go to the emergency room in the past for respiratory distress.  Review of systems as in HPI.  No Known Allergies  OBJECTIVE:   /74   Pulse 86   Temp 98.6  F (37  C) (Oral)   Resp 16   Wt (!) 228 lb 9.6 oz (103.7 kg)   SpO2 99%   BMI 36.90 kg/m    HEENT: Bilateral ear exam showed no inflammation of tympanic membranes or   canals. Nose exam: No discharge. Sinuses nontender on palpation. Oral   exam: The patient had moderate erythema, inflammation of oropharynx.   NECK: Supple, mildly tender and bilateral anterior cervical lymphadenopathy.   LUNGS: Inspiration decreased as compared to expiration, equal chest rise, no retractions.  CARDIOVASCULAR: S1, S2, regular rate and rhythm, no murmur.   ASSESSMENT:  1. Moderate asthma with exacerbation, unspecified whether persistent  albuterol nebulizer solution 3 mL (PROVENTIL)    predniSONE (DELTASONE) 10 mg tablet    montelukast (SINGULAIR) 10 mg tablet     PLAN:  Albuterol neb given in office and patient went from a 97% O2 sat to 99% O2 sat.  Auscultation showed improvement in inspiration.  Advised to continue to use albuterol every 6 hours.  Will start Singulair as well.  We will do burst and taper of prednisone.  Follow-up with primary provider if symptoms are not improving.  Use albuterol after every episode of spasmodic cough, may use this in 2 or 3 doses if needed for worsening cough, if still short of breath follow-up in the emergency  room.  Kathia Case, MS, PA-C

## 2021-06-26 NOTE — TELEPHONE ENCOUNTER
Refill Approved    Rx renewed per Medication Renewal Policy. Medication was last renewed on 6/25/20 .    Tyra Valadez, [facility] Triage/Med Refill 6/25/2021     Requested Prescriptions   Pending Prescriptions Disp Refills     atorvastatin (LIPITOR) 20 MG tablet [Pharmacy Med Name: ATORVASTATIN 20MG TABLETS] 90 tablet 3     Sig: TAKE 1 TABLET(20 MG) BY MOUTH DAILY       Statins Refill Protocol (Hmg CoA Reductase Inhibitors) Passed - 6/24/2021  5:14 AM        Passed - PCP or prescribing provider visit in past 12 months      Last office visit with prescriber/PCP: 3/21/2019 Usama Merlos MD OR same dept: 4/28/2021 Saul Ribeiro MD OR same specialty: 4/28/2021 Saul Ribeiro MD  Last physical: Visit date not found Last MTM visit: Visit date not found   Next visit within 3 mo: Visit date not found  Next physical within 3 mo: Visit date not found  Prescriber OR PCP: Usama Merlos MD  Last diagnosis associated with med order: 1. Type 2 diabetes mellitus without complication, without long-term current use of insulin (H)  - atorvastatin (LIPITOR) 20 MG tablet [Pharmacy Med Name: ATORVASTATIN 20MG TABLETS]; TAKE 1 TABLET(20 MG) BY MOUTH DAILY  Dispense: 90 tablet; Refill: 3    2. Dyslipidemia  - atorvastatin (LIPITOR) 20 MG tablet [Pharmacy Med Name: ATORVASTATIN 20MG TABLETS]; TAKE 1 TABLET(20 MG) BY MOUTH DAILY  Dispense: 90 tablet; Refill: 3    If protocol passes may refill for 12 months if within 3 months of last provider visit (or a total of 15 months).

## 2021-06-29 NOTE — PROGRESS NOTES
Progress Notes by Alicia Goodrich RN at 6/9/2020  3:00 PM     Author: Alicia Goodrich RN Service: -- Author Type: Registered Nurse    Filed: 6/9/2020  2:00 PM Encounter Date: 6/9/2020 Status: Signed    : Alicia Goodrich RN (Registered Nurse)       Clinic Care Coordination Contact    Clinic Care Coordination Contact  OUTREACH    Referral Information:  Referral Source: Care Team    Primary Diagnosis: Other (include Comment box)(covid-19)    Chief Complaint   Patient presents with   ? Clinic Care Coordination - Initial     Clinic Utilization  Difficulty keeping appointments:: No  Compliance Concerns: No  No-Show Concerns: No  No PCP office visit in Past Year: No  Utilization    Last refreshed: 6/9/2020 12:53 AM:  Hospital Admissions 1           Last refreshed: 6/9/2020 12:53 AM:  ED Visits 1           Last refreshed: 6/9/2020 12:53 AM:  No Show Count (past year) 1              Current as of: 6/9/2020 12:53 AM              Clinical Concerns:  RN assessment completed today with patient via phone due to covid-19.   Patient states he has been doing much better since he was discharged from the hospital.   Denies fever, or shortness of breath but continue to have occasional cough but gradually getting better.     Had INF with PCP on 5/22/2020 with no major concerns.   Denies financial issues or food insecurity.  Denies abuse or neglect.  States he' has no concerns.   A1c - 8.8 - states he will continue to work with PCP to address uncontrolled A1c. States A1c elevated because he was sick.   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     Pain  Pain (GOAL):: No  Health Maintenance Reviewed:    Clinical Pathway: None     Medication Management:  Patient declined to reviewed med list with CCC RN today stating that he's magnus to take his meds correctly and has noc concerns.        Functional Status:  Dependent ADLs:: Independent  Dependent IADLs:: Independent  Bed or wheelchair confined:: No  Mobility Status: Independent  Fallen 2  or more times in the past year?: No  Any fall with injury in the past year?: No    Living Situation:  Type of residence:: Private home - stairs    Lifestyle & Psychosocial Needs:        Diet:: Diabetic diet  Inadequate nutrition (GOAL):: No  Tube Feeding: No  Inadequate activity/exercise (GOAL):: No  Significant changes in sleep pattern (GOAL): No  Transportation means:: Regular car     Restoration or spiritual beliefs that impact treatment:: No  Mental health DX:: No  Mental health management concern (GOAL):: No  Informal Support system:: Children, Family, [place] based, Friends, Spouse   Socioeconomic History   ? Marital status:      Spouse name: Not on file   ? Number of children: Not on file   ? Years of education: Not on file   ? Highest education level: Not on file     Tobacco Use   ? Smoking status: Never Smoker   ? Smokeless tobacco: Never Used   Substance and Sexual Activity   ? Alcohol use: Yes     Alcohol/week: 1.0 standard drinks     Types: 1 Glasses of wine per week     Comment: Ocasional   ? Drug use: Never   ? Sexual activity: Yes     Partners: Female         Resources and Interventions:  Current Resources:      Community Resources: None  Supplies used at home:: None  Equipment Currently Used at Home: none    Advance Care Plan/Directive  Advanced Care Plans/Directives on file:: No    Referrals Placed: None     Goals:       n/a     Future Appointments              Today [facility] Clinical Support, [facility]    In 1 month Saul Ribeiro MD [facility] , [facility]          Plan:   1) Not a candidate

## 2021-07-04 NOTE — TELEPHONE ENCOUNTER
Telephone Encounter by Alexandria Dewitt at 5/26/2021  2:09 PM     Author: Alexandria Dewitt Service: -- Author Type: --    Filed: 5/26/2021  2:10 PM Encounter Date: 5/21/2021 Status: Signed    : Alexandria Dewitt       PRIOR AUTHORIZATION DENIED    Denial Rational: Insurance requiring patient to try/fail at least two preferred alternatives dexamethasone sodium p, fluorometholone, loteprednol, etabonate, prednisolone acetate, Inveltys, Lotemax          Appeal Information: This medication was denied. If physician would like to appeal because patient has contraindication or allergy to covered medication please write letter of medical necessity and route back to PA team to initiate.  If no further action is needed please close encounter thank you.

## 2021-07-14 PROBLEM — J45.41 MODERATE PERSISTENT ASTHMA WITH EXACERBATION: Status: RESOLVED | Noted: 2019-03-21 | Resolved: 2019-03-21

## 2021-09-17 ENCOUNTER — OFFICE VISIT (OUTPATIENT)
Dept: FAMILY MEDICINE | Facility: CLINIC | Age: 46
End: 2021-09-17
Payer: COMMERCIAL

## 2021-09-17 VITALS
SYSTOLIC BLOOD PRESSURE: 136 MMHG | OXYGEN SATURATION: 97 % | WEIGHT: 228.5 LBS | BODY MASS INDEX: 37.84 KG/M2 | DIASTOLIC BLOOD PRESSURE: 78 MMHG | HEART RATE: 77 BPM | TEMPERATURE: 98.4 F

## 2021-09-17 DIAGNOSIS — E11.9 TYPE 2 DIABETES MELLITUS WITHOUT COMPLICATION, WITHOUT LONG-TERM CURRENT USE OF INSULIN (H): Primary | ICD-10-CM

## 2021-09-17 DIAGNOSIS — E78.5 DYSLIPIDEMIA: ICD-10-CM

## 2021-09-17 DIAGNOSIS — Z23 NEED FOR VACCINATION: ICD-10-CM

## 2021-09-17 LAB — HBA1C MFR BLD: 7.7 % (ref 0–5.6)

## 2021-09-17 PROCEDURE — 90471 IMMUNIZATION ADMIN: CPT | Performed by: FAMILY MEDICINE

## 2021-09-17 PROCEDURE — 99213 OFFICE O/P EST LOW 20 MIN: CPT | Mod: 25 | Performed by: FAMILY MEDICINE

## 2021-09-17 PROCEDURE — 90686 IIV4 VACC NO PRSV 0.5 ML IM: CPT | Performed by: FAMILY MEDICINE

## 2021-09-17 PROCEDURE — 83036 HEMOGLOBIN GLYCOSYLATED A1C: CPT | Performed by: FAMILY MEDICINE

## 2021-09-17 PROCEDURE — 36415 COLL VENOUS BLD VENIPUNCTURE: CPT | Performed by: FAMILY MEDICINE

## 2021-09-17 RX ORDER — GLIPIZIDE 5 MG/1
5 TABLET, FILM COATED, EXTENDED RELEASE ORAL
Qty: 90 TABLET | Refills: 3 | Status: SHIPPED | OUTPATIENT
Start: 2021-09-17 | End: 2021-09-17

## 2021-09-17 RX ORDER — GLIPIZIDE 10 MG/1
10 TABLET, FILM COATED, EXTENDED RELEASE ORAL DAILY
Qty: 90 TABLET | Refills: 3 | Status: SHIPPED | OUTPATIENT
Start: 2021-09-17 | End: 2021-09-22

## 2021-09-17 RX ORDER — ATORVASTATIN CALCIUM 20 MG/1
TABLET, FILM COATED ORAL
Qty: 90 TABLET | Refills: 3 | Status: SHIPPED | OUTPATIENT
Start: 2021-09-17 | End: 2022-09-06

## 2021-09-18 ASSESSMENT — ASTHMA QUESTIONNAIRES: ACT_TOTALSCORE: 25

## 2021-09-21 NOTE — PROGRESS NOTES
"    Assessment & Plan     Type 2 diabetes mellitus without complication, without long-term current use of insulin (H)    - Hemoglobin A1c  - atorvastatin (LIPITOR) 20 MG tablet  Dispense: 90 tablet; Refill: 3  - glipiZIDE (GLUCOTROL XL) 10 MG 24 hr tablet  Dispense: 90 tablet; Refill: 3    Dyslipidemia    - atorvastatin (LIPITOR) 20 MG tablet  Dispense: 90 tablet; Refill: 3    Need for vaccination    - INFLUENZA VACCINE IM >6 MO VALENT IIV4 (ALFURIA/FLUZONE)               BMI:   Estimated body mass index is 37.84 kg/m  as calculated from the following:    Height as of 4/28/21: 1.655 m (5' 5.16\").    Weight as of this encounter: 103.6 kg (228 lb 8 oz).           Return in about 4 months (around 1/17/2022) for DM.    Saul Ribeiro MD, MD  [facility]    Subjective   Sunshine is a 46 year old who presents for the following health issues     HPI     Fingersticks 140-180.    Co-pay was high with [employer].    He resumed eating a little rice.      Current Outpatient Medications   Medication Sig Dispense Refill     atorvastatin (LIPITOR) 20 MG tablet [ATORVASTATIN (LIPITOR) 20 MG TABLET] TAKE 1 TABLET(20 MG) BY MOUTH DAILY 90 tablet 3     atorvastatin (LIPITOR) 20 MG tablet [ATORVASTATIN (LIPITOR) 20 MG TABLET] TAKE 1 TABLET(20 MG) BY MOUTH DAILY 90 tablet 1     blood glucose test (CONTOUR NEXT TEST STRIPS) strips [BLOOD GLUCOSE TEST (CONTOUR NEXT TEST STRIPS) STRIPS] USE TO CHECK BLOOD SUGAR ONCE DAILY 150 strip 2     docusate sodium (COLACE) 100 MG capsule [DOCUSATE SODIUM (COLACE) 100 MG CAPSULE] Take 1 capsule (100 mg total) by mouth 2 (two) times a day as needed for constipation. 60 capsule 3     dulaglutide (TRULICITY) 1.5 mg/0.5 mL PnIj [DULAGLUTIDE (TRULICITY) 1.5 MG/0.5 ML PNIJ] Inject 1.5 mg under the skin every 7 days. 2 mL 11     generic lancets (MICROLET LANCET) [GENERIC LANCETS (MICROLET LANCET)] USE TO CHECK BLOOD SUGARS ONCE DAILY. 200 each 3     glipiZIDE (GLUCOTROL XL) 10 MG 24 hr " tablet Take 1 tablet (10 mg) by mouth daily 90 tablet 3     metFORMIN (GLUCOPHAGE-XR) 500 MG 24 hr tablet [METFORMIN (GLUCOPHAGE-XR) 500 MG 24 HR TABLET] TAKE 4 TABLETS(2000 MG) BY MOUTH DAILY WITH BREAKFAST 360 tablet 2     polyethylene glycol (GLYCOLAX) 17 gram/dose powder [POLYETHYLENE GLYCOL (GLYCOLAX) 17 GRAM/DOSE POWDER] Take 17 g by mouth daily. 235 g 2     betamethasone dipropionate (DEL-BETA) 0.05 % cream [BETAMETHASONE DIPROPIONATE (DEL-BETA) 0.05 % CREAM]  (Patient not taking: Reported on 9/17/2021)           Review of Systems   No fever or cough.      Objective    /78 (BP Location: Right arm, Patient Position: Sitting, Cuff Size: Adult Regular)   Pulse 77   Temp 98.4  F (36.9  C) (Oral)   Wt 103.6 kg (228 lb 8 oz)   SpO2 97%   BMI 37.84 kg/m    Body mass index is 37.84 kg/m .  Physical Exam   Heart normal  Lungs normal    A1c 7.7, had been 7.0

## 2021-09-22 DIAGNOSIS — E11.9 TYPE 2 DIABETES MELLITUS WITHOUT COMPLICATION, WITHOUT LONG-TERM CURRENT USE OF INSULIN (H): ICD-10-CM

## 2021-09-22 RX ORDER — GLIPIZIDE 10 MG/1
TABLET, FILM COATED, EXTENDED RELEASE ORAL
Qty: 90 TABLET | Refills: 3 | Status: SHIPPED | OUTPATIENT
Start: 2021-09-22 | End: 2022-09-20

## 2021-09-22 RX ORDER — METFORMIN HCL 500 MG
TABLET, EXTENDED RELEASE 24 HR ORAL
Qty: 360 TABLET | Refills: 2 | Status: SHIPPED | OUTPATIENT
Start: 2021-09-22 | End: 2022-06-20

## 2021-11-18 ENCOUNTER — OFFICE VISIT (OUTPATIENT)
Dept: FAMILY MEDICINE | Facility: CLINIC | Age: 46
End: 2021-11-18
Payer: COMMERCIAL

## 2021-11-18 ENCOUNTER — VIRTUAL VISIT (OUTPATIENT)
Dept: URGENT CARE | Facility: CLINIC | Age: 46
End: 2021-11-18
Payer: COMMERCIAL

## 2021-11-18 VITALS
WEIGHT: 228.4 LBS | TEMPERATURE: 96.8 F | RESPIRATION RATE: 18 BRPM | SYSTOLIC BLOOD PRESSURE: 152 MMHG | BODY MASS INDEX: 37.82 KG/M2 | HEART RATE: 71 BPM | OXYGEN SATURATION: 96 % | DIASTOLIC BLOOD PRESSURE: 87 MMHG

## 2021-11-18 DIAGNOSIS — L03.213 PRESEPTAL CELLULITIS OF LEFT LOWER EYELID: Primary | ICD-10-CM

## 2021-11-18 DIAGNOSIS — H57.12 EYE PAIN, LEFT: Primary | ICD-10-CM

## 2021-11-18 PROCEDURE — 99213 OFFICE O/P EST LOW 20 MIN: CPT | Performed by: PHYSICIAN ASSISTANT

## 2021-11-18 PROCEDURE — 99207 PR NO BILLABLE SERVICE THIS VISIT: CPT | Performed by: EMERGENCY MEDICINE

## 2021-11-18 RX ORDER — CEPHALEXIN 500 MG/1
500 CAPSULE ORAL 2 TIMES DAILY
Status: CANCELLED | OUTPATIENT
Start: 2021-11-18

## 2021-11-18 NOTE — PATIENT INSTRUCTIONS
Warm compresses frequently.    If worsening or not improving after 3 days please recheck with eye doctor.    Ibuprofen 800 mg for pain 3 x per day with food.

## 2021-11-18 NOTE — PROGRESS NOTES
Phone appointment:    Assessment: 1 week history of left eye swelling and pain.    Plan: Critical that patient's eye be examined.  He is directed to urgent care and will come to [facility] this morning      CHIEF COMPLAINT: Left eye pain      HPI: Patient is a 46-year-old male whose had a 1 week history of pain and swelling involving his left eye.  There might be some crusty exudate but patient is best assessment.      ROS: See HPI otherwise normal.    No Known Allergies   Current Outpatient Medications   Medication Sig Dispense Refill     atorvastatin (LIPITOR) 20 MG tablet [ATORVASTATIN (LIPITOR) 20 MG TABLET] TAKE 1 TABLET(20 MG) BY MOUTH DAILY 90 tablet 3     atorvastatin (LIPITOR) 20 MG tablet [ATORVASTATIN (LIPITOR) 20 MG TABLET] TAKE 1 TABLET(20 MG) BY MOUTH DAILY 90 tablet 1     betamethasone dipropionate (DEL-BETA) 0.05 % cream [BETAMETHASONE DIPROPIONATE (DEL-BETA) 0.05 % CREAM]  (Patient not taking: Reported on 9/17/2021)       blood glucose test (CONTOUR NEXT TEST STRIPS) strips [BLOOD GLUCOSE TEST (CONTOUR NEXT TEST STRIPS) STRIPS] USE TO CHECK BLOOD SUGAR ONCE DAILY 150 strip 2     docusate sodium (COLACE) 100 MG capsule [DOCUSATE SODIUM (COLACE) 100 MG CAPSULE] Take 1 capsule (100 mg total) by mouth 2 (two) times a day as needed for constipation. 60 capsule 3     dulaglutide (TRULICITY) 1.5 mg/0.5 mL PnIj [DULAGLUTIDE (TRULICITY) 1.5 MG/0.5 ML PNIJ] Inject 1.5 mg under the skin every 7 days. 2 mL 11     generic lancets (MICROLET LANCET) [GENERIC LANCETS (MICROLET LANCET)] USE TO CHECK BLOOD SUGARS ONCE DAILY. 200 each 3     glipiZIDE (GLUCOTROL XL) 10 MG 24 hr tablet TAKE 1 TABLET(10 MG) BY MOUTH DAILY 90 tablet 3     metFORMIN (GLUCOPHAGE-XR) 500 MG 24 hr tablet TAKE 4 TABLETS(2000 MG) BY MOUTH DAILY WITH BREAKFAST 360 tablet 2     polyethylene glycol (GLYCOLAX) 17 gram/dose powder [POLYETHYLENE GLYCOL (GLYCOLAX) 17 GRAM/DOSE POWDER] Take 17 g by mouth daily. 235 g 2         PE: No acute  distress.  Alert.  Exam otherwise deferred due to phone appointment.        TREATMENT: None.      ASSESSMENT: Left eye pain and swelling.  Concern for periorbital infection, eyelid cellulitis, versus conjunctivitis.      DIAGNOSIS: Left eye pain      PLAN: Patient is directed to be seen today.  He plans to go to the [place] urgent care this morning.    Time: 5 minutes

## 2021-11-18 NOTE — PROGRESS NOTES
Assessment & Plan     Preseptal cellulitis of left lower eyelid  Augmentin as ordered. Warm compresses.  If worsening or not improving in the next 48 hours should recheck.  If any eye pain, vision change soul be rechecked as well.  Pt agreeable with plan.    At the end of the encounter, I discussed results, diagnosis, medications. Discussed red flags for immediate return to clinic/ER, as well as indications for follow up if no improvement. Patient understood and agreed to plan. Patient was stable for discharge  - amoxicillin-clavulanate (AUGMENTIN) 875-125 MG tablet  Dispense: 20 tablet; Refill: 0     Return in about 1 day (around 11/19/2021), or if not improving.    Aurelia Handley PA-C  [facility] is a 46 year old male who presents to clinic today for the following health issues:  Chief Complaint   Patient presents with     Eye Problem     Lt eye red and swollen x 3days     HPI    Insoidious onset redness, watering, discomfort of the lower eye lid.    No photophobia.   No contacts.   No vision change.    Did have minor episode while hunting 1 week ago where he though some debris flew in his eye but it came out without difficulty and did not feel he had any foreign body that remained.  No fevers. HA.  No hx of stye       Review of Systems  Constitutional, HEENT, cardiovascular, pulmonary, gi and gu systems are negative, except as otherwise noted.    Patient Active Problem List   Diagnosis     Constipation     Type 2 diabetes mellitus without complication, without long-term current use of insulin (H)     Lichen simplex chronicus     Acute chest pain     COVID-19 virus infection     Acute and chronic respiratory failure with hypoxia (H)     COVID-19     Mixed hyperlipidemia     Dupuytren's contracture of right hand           Objective    BP (!) 152/87   Pulse 71   Temp 96.8  F (36  C) (Tympanic)   Resp 18   Wt 103.6 kg (228 lb 6.3 oz)   SpO2 96%   BMI 37.82  kg/m    Physical Exam   nad appears well  Exam of the L eye reveals lower lid redness and swelling with TTP primarily tender lateral 1/3 of the lower lid.    No upper lid involvement. Mild lateral conjunctival injection.  No FB noted with magnification but no slit lamp available.    Fluoracine dye placed, no increased uptake.    PERRL  No discharge present.

## 2021-12-08 DIAGNOSIS — Z76.0 ENCOUNTER FOR MEDICATION REFILL: Primary | ICD-10-CM

## 2021-12-08 DIAGNOSIS — E11.9 TYPE 2 DIABETES MELLITUS WITHOUT COMPLICATION, WITHOUT LONG-TERM CURRENT USE OF INSULIN (H): ICD-10-CM

## 2021-12-08 RX ORDER — DULAGLUTIDE 1.5 MG/.5ML
INJECTION, SOLUTION SUBCUTANEOUS
Qty: 2 ML | Refills: 11 | Status: SHIPPED | OUTPATIENT
Start: 2021-12-08 | End: 2023-02-01

## 2022-02-11 ENCOUNTER — OFFICE VISIT (OUTPATIENT)
Dept: FAMILY MEDICINE | Facility: CLINIC | Age: 47
End: 2022-02-11
Payer: COMMERCIAL

## 2022-02-11 VITALS
WEIGHT: 232 LBS | HEIGHT: 65 IN | DIASTOLIC BLOOD PRESSURE: 76 MMHG | SYSTOLIC BLOOD PRESSURE: 138 MMHG | RESPIRATION RATE: 16 BRPM | TEMPERATURE: 98.2 F | HEART RATE: 78 BPM | BODY MASS INDEX: 38.65 KG/M2

## 2022-02-11 DIAGNOSIS — E11.9 TYPE 2 DIABETES MELLITUS WITHOUT COMPLICATION, WITHOUT LONG-TERM CURRENT USE OF INSULIN (H): Primary | ICD-10-CM

## 2022-02-11 DIAGNOSIS — E78.2 MIXED HYPERLIPIDEMIA: ICD-10-CM

## 2022-02-11 DIAGNOSIS — R80.9 MICROALBUMINURIA: ICD-10-CM

## 2022-02-11 LAB
HBA1C MFR BLD: 7.9 % (ref 0–5.6)
HOLD SPECIMEN: NORMAL

## 2022-02-11 PROCEDURE — 99214 OFFICE O/P EST MOD 30 MIN: CPT | Performed by: FAMILY MEDICINE

## 2022-02-11 PROCEDURE — 83036 HEMOGLOBIN GLYCOSYLATED A1C: CPT | Performed by: FAMILY MEDICINE

## 2022-02-11 PROCEDURE — 36415 COLL VENOUS BLD VENIPUNCTURE: CPT | Performed by: FAMILY MEDICINE

## 2022-02-11 RX ORDER — LISINOPRIL 5 MG/1
5 TABLET ORAL DAILY
Qty: 90 TABLET | Refills: 3 | Status: SHIPPED | OUTPATIENT
Start: 2022-02-11 | End: 2023-01-27

## 2022-02-11 ASSESSMENT — MIFFLIN-ST. JEOR: SCORE: 1866.77

## 2022-02-16 NOTE — PROGRESS NOTES
"  Assessment & Plan     Type 2 diabetes mellitus without complication, without long-term current use of insulin (H)    Stable.  Continue current medications for diabetes.    Walk, to decrease weight and glucose.    - Hemoglobin A1c  - Extra Tube  - Hemoglobin A1c    Microalbuminuria     Begin lisinopril.  Potential side effects discussed.    - lisinopril (ZESTRIL) 5 MG tablet  Dispense: 90 tablet; Refill: 3    Mixed hyperlipidemia    Stable.             BMI:   Estimated body mass index is 38.42 kg/m  as calculated from the following:    Height as of this encounter: 1.655 m (5' 5.16\").    Weight as of this encounter: 105.2 kg (232 lb).           Return in about 4 months (around 6/11/2022) for DM.    Saul Ribeiro MD, MD  [facility] [place]    Subjective   Sunshine is a 46 year old who presents for the following health issues     HPI     Taking metformin, glipizide and trulicity.    Microalbumin was 12.58    Weight increased 4 # since September.    Prior LDL was 48.  Taking atorvastatin.    Mother has hypertension.    Current Outpatient Medications   Medication Sig Dispense Refill     atorvastatin (LIPITOR) 20 MG tablet [ATORVASTATIN (LIPITOR) 20 MG TABLET] TAKE 1 TABLET(20 MG) BY MOUTH DAILY 90 tablet 3     blood glucose test (CONTOUR NEXT TEST STRIPS) strips [BLOOD GLUCOSE TEST (CONTOUR NEXT TEST STRIPS) STRIPS] USE TO CHECK BLOOD SUGAR ONCE DAILY 150 strip 2     generic lancets (MICROLET LANCET) [GENERIC LANCETS (MICROLET LANCET)] USE TO CHECK BLOOD SUGARS ONCE DAILY. 200 each 3     glipiZIDE (GLUCOTROL XL) 10 MG 24 hr tablet TAKE 1 TABLET(10 MG) BY MOUTH DAILY 90 tablet 3     lisinopril (ZESTRIL) 5 MG tablet Take 1 tablet (5 mg) by mouth daily 90 tablet 3     metFORMIN (GLUCOPHAGE-XR) 500 MG 24 hr tablet TAKE 4 TABLETS(2000 MG) BY MOUTH DAILY WITH BREAKFAST 360 tablet 2     TRULICITY 1.5 MG/0.5ML pen ADMINISTER 1.5 MG UNDER THE SKIN EVERY 7 DAYS 2 mL 11     betamethasone dipropionate (DEL-BETA) " "0.05 % cream [BETAMETHASONE DIPROPIONATE (DEL-BETA) 0.05 % CREAM]  (Patient not taking: Reported on 9/17/2021)       docusate sodium (COLACE) 100 MG capsule [DOCUSATE SODIUM (COLACE) 100 MG CAPSULE] Take 1 capsule (100 mg total) by mouth 2 (two) times a day as needed for constipation. (Patient not taking: Reported on 11/18/2021) 60 capsule 3     polyethylene glycol (GLYCOLAX) 17 gram/dose powder [POLYETHYLENE GLYCOL (GLYCOLAX) 17 GRAM/DOSE POWDER] Take 17 g by mouth daily. (Patient not taking: Reported on 11/18/2021) 235 g 2         Review of Systems   No fever or cough.      Objective    /76   Pulse 78   Temp 98.2  F (36.8  C) (Oral)   Resp 16   Ht 1.655 m (5' 5.16\")   Wt 105.2 kg (232 lb)   BMI 38.42 kg/m    Body mass index is 38.42 kg/m .  Physical Exam   Heat normal  Lungs normal    A1c 7.9, had been 7.7            "

## 2022-06-14 ENCOUNTER — OFFICE VISIT (OUTPATIENT)
Dept: FAMILY MEDICINE | Facility: CLINIC | Age: 47
End: 2022-06-14
Payer: COMMERCIAL

## 2022-06-14 VITALS
SYSTOLIC BLOOD PRESSURE: 128 MMHG | OXYGEN SATURATION: 96 % | BODY MASS INDEX: 37.05 KG/M2 | WEIGHT: 223.75 LBS | DIASTOLIC BLOOD PRESSURE: 70 MMHG | TEMPERATURE: 97.7 F | HEART RATE: 72 BPM

## 2022-06-14 DIAGNOSIS — Z12.11 SCREEN FOR COLON CANCER: ICD-10-CM

## 2022-06-14 DIAGNOSIS — Z11.59 NEED FOR HEPATITIS C SCREENING TEST: ICD-10-CM

## 2022-06-14 DIAGNOSIS — E11.9 TYPE 2 DIABETES MELLITUS WITHOUT COMPLICATION, WITHOUT LONG-TERM CURRENT USE OF INSULIN (H): Primary | ICD-10-CM

## 2022-06-14 DIAGNOSIS — E78.2 MIXED HYPERLIPIDEMIA: ICD-10-CM

## 2022-06-14 LAB
ALBUMIN SERPL-MCNC: 3.8 G/DL (ref 3.5–5)
ALP SERPL-CCNC: 114 U/L (ref 45–120)
ALT SERPL W P-5'-P-CCNC: 77 U/L (ref 0–45)
ANION GAP SERPL CALCULATED.3IONS-SCNC: 11 MMOL/L (ref 5–18)
AST SERPL W P-5'-P-CCNC: 34 U/L (ref 0–40)
BILIRUB SERPL-MCNC: 0.7 MG/DL (ref 0–1)
BUN SERPL-MCNC: 19 MG/DL (ref 8–22)
CALCIUM SERPL-MCNC: 8.9 MG/DL (ref 8.5–10.5)
CHLORIDE BLD-SCNC: 103 MMOL/L (ref 98–107)
CHOLEST SERPL-MCNC: 101 MG/DL
CO2 SERPL-SCNC: 23 MMOL/L (ref 22–31)
CREAT SERPL-MCNC: 1.02 MG/DL (ref 0.7–1.3)
CREAT UR-MCNC: 141 MG/DL
FASTING STATUS PATIENT QL REPORTED: YES
GFR SERPL CREATININE-BSD FRML MDRD: >90 ML/MIN/1.73M2
GLUCOSE BLD-MCNC: 278 MG/DL (ref 70–125)
HBA1C MFR BLD: 8.6 % (ref 0–5.6)
HCV AB SERPL QL IA: NONREACTIVE
HDLC SERPL-MCNC: 23 MG/DL
LDLC SERPL CALC-MCNC: 47 MG/DL
MICROALBUMIN UR-MCNC: 11.95 MG/DL (ref 0–1.99)
MICROALBUMIN/CREAT UR: 84.8 MG/G CR
POTASSIUM BLD-SCNC: 4.1 MMOL/L (ref 3.5–5)
PROT SERPL-MCNC: 7.5 G/DL (ref 6–8)
SODIUM SERPL-SCNC: 137 MMOL/L (ref 136–145)
TRIGL SERPL-MCNC: 155 MG/DL

## 2022-06-14 PROCEDURE — 80061 LIPID PANEL: CPT | Performed by: FAMILY MEDICINE

## 2022-06-14 PROCEDURE — 80053 COMPREHEN METABOLIC PANEL: CPT | Performed by: FAMILY MEDICINE

## 2022-06-14 PROCEDURE — 36415 COLL VENOUS BLD VENIPUNCTURE: CPT | Performed by: FAMILY MEDICINE

## 2022-06-14 PROCEDURE — 86803 HEPATITIS C AB TEST: CPT | Performed by: FAMILY MEDICINE

## 2022-06-14 PROCEDURE — 83036 HEMOGLOBIN GLYCOSYLATED A1C: CPT | Performed by: FAMILY MEDICINE

## 2022-06-14 PROCEDURE — 99214 OFFICE O/P EST MOD 30 MIN: CPT | Performed by: FAMILY MEDICINE

## 2022-06-14 PROCEDURE — 82043 UR ALBUMIN QUANTITATIVE: CPT | Performed by: FAMILY MEDICINE

## 2022-06-14 ASSESSMENT — ASTHMA QUESTIONNAIRES
ACT_TOTALSCORE: 25
QUESTION_1 LAST FOUR WEEKS HOW MUCH OF THE TIME DID YOUR ASTHMA KEEP YOU FROM GETTING AS MUCH DONE AT WORK, SCHOOL OR AT HOME: NONE OF THE TIME
QUESTION_5 LAST FOUR WEEKS HOW WOULD YOU RATE YOUR ASTHMA CONTROL: COMPLETELY CONTROLLED
QUESTION_4 LAST FOUR WEEKS HOW OFTEN HAVE YOU USED YOUR RESCUE INHALER OR NEBULIZER MEDICATION (SUCH AS ALBUTEROL): NOT AT ALL
QUESTION_3 LAST FOUR WEEKS HOW OFTEN DID YOUR ASTHMA SYMPTOMS (WHEEZING, COUGHING, SHORTNESS OF BREATH, CHEST TIGHTNESS OR PAIN) WAKE YOU UP AT NIGHT OR EARLIER THAN USUAL IN THE MORNING: NOT AT ALL
ACT_TOTALSCORE: 25
QUESTION_2 LAST FOUR WEEKS HOW OFTEN HAVE YOU HAD SHORTNESS OF BREATH: NOT AT ALL

## 2022-06-14 NOTE — PROGRESS NOTES
"  Assessment & Plan     Type 2 diabetes mellitus without complication, without long-term current use of insulin (H)    - Albumin Random Urine Quantitative with Creat Ratio  - blood glucose (CONTOUR NEXT TEST) test strip  Dispense: 100 strip; Refill: 3  - Hemoglobin A1c  - Comprehensive metabolic panel (BMP + Alb, Alk Phos, ALT, AST, Total. Bili, TP)  - Comprehensive metabolic panel (BMP + Alb, Alk Phos, ALT, AST, Total. Bili, TP)  - Albumin Random Urine Quantitative with Creat Ratio    Mixed hyperlipidemia    - Lipid panel reflex to direct LDL Fasting  - Lipid panel reflex to direct LDL Fasting    Screen for colon cancer    - Adult Gastro Ref - Procedure Only    Need for hepatitis C screening test    - Hepatitis C Screen Reflex to HCV RNA Quant and Genotype  - Hepatitis C Screen Reflex to HCV RNA Quant and Genotype    He declined HIV      Ordering of each unique test         BMI:   Estimated body mass index is 37.05 kg/m  as calculated from the following:    Height as of 2/11/22: 1.655 m (5' 5.16\").    Weight as of this encounter: 101.5 kg (223 lb 12 oz).           Return in about 4 months (around 10/14/2022) for DM.    Saul Ribeiro MD, MD  [facility] is a 47 year old who presents for the following health issues     History of Present Illness       Diabetes:   He presents for follow up of diabetes.  He is checking home blood glucose a few times a month. He checks blood glucose before and after meals.  Blood glucose is sometimes over 200 and sometimes under 70. He is aware of hypoglycemia symptoms including shakiness. He has no concerns regarding his diabetes at this time.  He is not experiencing numbness or burning in feet, excessive thirst, blurry vision, weight changes or redness, sores or blisters on feet. The patient has not had a diabetic eye exam in the last 12 months.         He eats 2-3 servings of fruits and vegetables daily.He consumes 0 sweetened " From: Jenna Fournier  To: Cynthia Terrell MD  Sent: 7/19/2021 5:32 PM EDT  Subject: Prescription Question    This message is being sent by Shannon Ibrahim on behalf of Jenna Fournier. Dustin Vega is still coughing. Plevern call in the antibiotic we discuseed last week for his sinus infection. Thank you! beverage(s) daily.He exercises with enough effort to increase his heart rate 20 to 29 minutes per day.  He exercises with enough effort to increase his heart rate 5 days per week.   He is taking medications regularly.       He has less pop.  Walking.  Weight decreased 9 #    Fingerstick 70 if he does not eat breakfast.    Fasting.    Goes to [facility].    Current Outpatient Medications   Medication Sig Dispense Refill     atorvastatin (LIPITOR) 20 MG tablet [ATORVASTATIN (LIPITOR) 20 MG TABLET] TAKE 1 TABLET(20 MG) BY MOUTH DAILY 90 tablet 3     blood glucose (CONTOUR NEXT TEST) test strip [BLOOD GLUCOSE TEST (CONTOUR NEXT TEST STRIPS) STRIPS] USE TO CHECK BLOOD SUGAR ONCE DAILY 100 strip 3     generic lancets (MICROLET LANCET) [GENERIC LANCETS (MICROLET LANCET)] USE TO CHECK BLOOD SUGARS ONCE DAILY. 200 each 3     glipiZIDE (GLUCOTROL XL) 10 MG 24 hr tablet TAKE 1 TABLET(10 MG) BY MOUTH DAILY 90 tablet 3     lisinopril (ZESTRIL) 5 MG tablet Take 1 tablet (5 mg) by mouth daily 90 tablet 3     metFORMIN (GLUCOPHAGE-XR) 500 MG 24 hr tablet TAKE 4 TABLETS(2000 MG) BY MOUTH DAILY WITH BREAKFAST 360 tablet 2     TRULICITY 1.5 MG/0.5ML pen ADMINISTER 1.5 MG UNDER THE SKIN EVERY 7 DAYS 2 mL 11     betamethasone dipropionate (DEL-BETA) 0.05 % cream [BETAMETHASONE DIPROPIONATE (DEL-BETA) 0.05 % CREAM]  (Patient not taking: No sig reported)       docusate sodium (COLACE) 100 MG capsule [DOCUSATE SODIUM (COLACE) 100 MG CAPSULE] Take 1 capsule (100 mg total) by mouth 2 (two) times a day as needed for constipation. (Patient not taking: No sig reported) 60 capsule 3     polyethylene glycol (GLYCOLAX) 17 gram/dose powder [POLYETHYLENE GLYCOL (GLYCOLAX) 17 GRAM/DOSE POWDER] Take 17 g by mouth daily. (Patient not taking: No sig reported) 235 g 2         Review of Systems   No fever or cough.      Objective    /70 (Cuff Size: Adult Regular)   Pulse 72   Temp 97.7  F (36.5  C)   Wt 101.5 kg (223 lb 12 oz)   SpO2 96%    BMI 37.05 kg/m    Body mass index is 37.05 kg/m .  Physical Exam   Heart normal  Lungs normal

## 2022-06-20 DIAGNOSIS — E11.9 TYPE 2 DIABETES MELLITUS WITHOUT COMPLICATION, WITHOUT LONG-TERM CURRENT USE OF INSULIN (H): ICD-10-CM

## 2022-06-20 DIAGNOSIS — Z76.0 ENCOUNTER FOR MEDICATION REFILL: Primary | ICD-10-CM

## 2022-06-20 NOTE — TELEPHONE ENCOUNTER
Medication refill queued and pended. Prescriber please review, sign, and send if appropriate. Thank you.     
FEVER/COUGH

## 2022-06-21 RX ORDER — METFORMIN HCL 500 MG
TABLET, EXTENDED RELEASE 24 HR ORAL
Qty: 360 TABLET | Refills: 3 | Status: SHIPPED | OUTPATIENT
Start: 2022-06-21 | End: 2023-08-23

## 2022-06-26 ENCOUNTER — HEALTH MAINTENANCE LETTER (OUTPATIENT)
Age: 47
End: 2022-06-26

## 2022-08-03 ENCOUNTER — TRANSFERRED RECORDS (OUTPATIENT)
Dept: HEALTH INFORMATION MANAGEMENT | Facility: CLINIC | Age: 47
End: 2022-08-03

## 2022-08-03 LAB — RETINOPATHY: POSITIVE

## 2022-09-06 DIAGNOSIS — E11.9 TYPE 2 DIABETES MELLITUS WITHOUT COMPLICATION, WITHOUT LONG-TERM CURRENT USE OF INSULIN (H): ICD-10-CM

## 2022-09-06 DIAGNOSIS — E78.5 DYSLIPIDEMIA: ICD-10-CM

## 2022-09-06 DIAGNOSIS — Z76.0 ENCOUNTER FOR MEDICATION REFILL: Primary | ICD-10-CM

## 2022-09-06 RX ORDER — ATORVASTATIN CALCIUM 20 MG/1
TABLET, FILM COATED ORAL
Qty: 90 TABLET | Refills: 3 | Status: SHIPPED | OUTPATIENT
Start: 2022-09-06 | End: 2023-09-12

## 2022-09-20 DIAGNOSIS — E11.9 TYPE 2 DIABETES MELLITUS WITHOUT COMPLICATION, WITHOUT LONG-TERM CURRENT USE OF INSULIN (H): ICD-10-CM

## 2022-09-20 DIAGNOSIS — Z76.0 ENCOUNTER FOR MEDICATION REFILL: Primary | ICD-10-CM

## 2022-09-20 RX ORDER — GLIPIZIDE 10 MG/1
10 TABLET, FILM COATED, EXTENDED RELEASE ORAL DAILY
Qty: 90 TABLET | Refills: 3 | Status: SHIPPED | OUTPATIENT
Start: 2022-09-20 | End: 2022-10-18

## 2022-10-06 ENCOUNTER — TRANSFERRED RECORDS (OUTPATIENT)
Dept: HEALTH INFORMATION MANAGEMENT | Facility: CLINIC | Age: 47
End: 2022-10-06

## 2022-10-18 ENCOUNTER — OFFICE VISIT (OUTPATIENT)
Dept: FAMILY MEDICINE | Facility: CLINIC | Age: 47
End: 2022-10-18
Payer: COMMERCIAL

## 2022-10-18 VITALS
DIASTOLIC BLOOD PRESSURE: 80 MMHG | HEART RATE: 68 BPM | WEIGHT: 224.12 LBS | TEMPERATURE: 98.4 F | RESPIRATION RATE: 14 BRPM | HEIGHT: 66 IN | SYSTOLIC BLOOD PRESSURE: 136 MMHG | BODY MASS INDEX: 36.02 KG/M2 | OXYGEN SATURATION: 97 %

## 2022-10-18 DIAGNOSIS — R80.9 MICROALBUMINURIA: ICD-10-CM

## 2022-10-18 DIAGNOSIS — E11.9 TYPE 2 DIABETES MELLITUS WITHOUT COMPLICATION, WITHOUT LONG-TERM CURRENT USE OF INSULIN (H): Primary | ICD-10-CM

## 2022-10-18 DIAGNOSIS — Z23 NEED FOR VACCINATION: ICD-10-CM

## 2022-10-18 PROBLEM — U07.1 COVID-19: Status: RESOLVED | Noted: 2020-05-17 | Resolved: 2022-10-18

## 2022-10-18 PROBLEM — U07.1 COVID-19 VIRUS INFECTION: Status: RESOLVED | Noted: 2020-05-17 | Resolved: 2022-10-18

## 2022-10-18 LAB — HBA1C MFR BLD: 7.8 % (ref 0–5.6)

## 2022-10-18 PROCEDURE — 90471 IMMUNIZATION ADMIN: CPT | Performed by: FAMILY MEDICINE

## 2022-10-18 PROCEDURE — 83036 HEMOGLOBIN GLYCOSYLATED A1C: CPT | Performed by: FAMILY MEDICINE

## 2022-10-18 PROCEDURE — 90686 IIV4 VACC NO PRSV 0.5 ML IM: CPT | Performed by: FAMILY MEDICINE

## 2022-10-18 PROCEDURE — 36415 COLL VENOUS BLD VENIPUNCTURE: CPT | Performed by: FAMILY MEDICINE

## 2022-10-18 PROCEDURE — 99214 OFFICE O/P EST MOD 30 MIN: CPT | Mod: 25 | Performed by: FAMILY MEDICINE

## 2022-10-18 RX ORDER — GLIPIZIDE 10 MG/1
20 TABLET, FILM COATED, EXTENDED RELEASE ORAL DAILY
Qty: 180 TABLET | Refills: 3 | Status: SHIPPED | OUTPATIENT
Start: 2022-10-18 | End: 2023-11-15

## 2022-10-18 NOTE — PROGRESS NOTES
"  Assessment & Plan     Type 2 diabetes mellitus without complication, without long-term current use of insulin (H)    Not at goal.  Increase glipizide to 2 per day.    - Hemoglobin A1c  - Hemoglobin A1c  - glipiZIDE (GLUCOTROL XL) 10 MG 24 hr tablet  Dispense: 180 tablet; Refill: 3    Microalbuminuria    Stable.  Taking lisinopril.    Need for vaccination    - INFLUENZA VACCINE IM > 6 MONTHS VALENT IIV4 (AFLURIA/FLUZONE)    He declined typhoid vaccine and malaria prophylaxis.      Prescription drug management         BMI:   Estimated body mass index is 35.64 kg/m  as calculated from the following:    Height as of this encounter: 1.689 m (5' 6.5\").    Weight as of this encounter: 101.7 kg (224 lb 1.9 oz).           Return in about 5 months (around 3/18/2023) for DM.    Saul Ribeiro MD  [facility] [place]    Subjective   Sunshalycia is a 47 year old, presenting for the following health issues:  Diabetes      History of Present Illness       Diabetes:   He presents for follow up of diabetes.  He is checking home blood glucose one time daily. He checks blood glucose before meals.  Blood glucose is never over 200 and never under 70. When his blood glucose is low, the patient is asymptomatic for confusion, blurred vision, lethargy and reports not feeling dizzy, shaky, or weak.  He has no concerns regarding his diabetes at this time.  He is not experiencing numbness or burning in feet, excessive thirst, blurry vision, weight changes or redness, sores or blisters on feet.             He has been exercising more.    He had colonoscopy.    Going to [facility] 11/22/22 for 1 month.    Current Outpatient Medications   Medication Sig Dispense Refill     atorvastatin (LIPITOR) 20 MG tablet TAKE 1 TABLET BY MOUTH EVERY DAY 90 tablet 3     blood glucose (CONTOUR NEXT TEST) test strip [BLOOD GLUCOSE TEST (CONTOUR NEXT TEST STRIPS) STRIPS] USE TO CHECK BLOOD SUGAR ONCE DAILY 100 strip 3     generic lancets (MICROLET " "LANCET) [GENERIC LANCETS (MICROLET LANCET)] USE TO CHECK BLOOD SUGARS ONCE DAILY. 200 each 3     glipiZIDE (GLUCOTROL XL) 10 MG 24 hr tablet Take 2 tablets (20 mg) by mouth daily 180 tablet 3     lisinopril (ZESTRIL) 5 MG tablet Take 1 tablet (5 mg) by mouth daily 90 tablet 3     metFORMIN (GLUCOPHAGE XR) 500 MG 24 hr tablet TAKE 4 TABLETS(2000 MG) BY MOUTH DAILY WITH BREAKFAST 360 tablet 3     TRULICITY 1.5 MG/0.5ML pen ADMINISTER 1.5 MG UNDER THE SKIN EVERY 7 DAYS 2 mL 11     betamethasone dipropionate (DEL-BETA) 0.05 % cream [BETAMETHASONE DIPROPIONATE (DEL-BETA) 0.05 % CREAM]  (Patient not taking: No sig reported)       docusate sodium (COLACE) 100 MG capsule [DOCUSATE SODIUM (COLACE) 100 MG CAPSULE] Take 1 capsule (100 mg total) by mouth 2 (two) times a day as needed for constipation. (Patient not taking: No sig reported) 60 capsule 3     polyethylene glycol (GLYCOLAX) 17 gram/dose powder [POLYETHYLENE GLYCOL (GLYCOLAX) 17 GRAM/DOSE POWDER] Take 17 g by mouth daily. (Patient not taking: No sig reported) 235 g 2         Review of Systems   No fever or cough.      Objective    /80   Pulse 68   Temp 98.4  F (36.9  C) (Oral)   Resp 14   Ht 1.689 m (5' 6.5\")   Wt 101.7 kg (224 lb 1.9 oz)   SpO2 97%   BMI 35.64 kg/m    Body mass index is 35.64 kg/m .  Physical Exam   Heart normal  Lungs normal  Feet normal    A1c 7.8, had been 8.6                    "

## 2022-10-29 PROBLEM — J96.21 ACUTE AND CHRONIC RESPIRATORY FAILURE WITH HYPOXIA (H): Status: RESOLVED | Noted: 2020-05-17 | Resolved: 2022-10-29

## 2023-01-27 DIAGNOSIS — Z76.0 ENCOUNTER FOR MEDICATION REFILL: Primary | ICD-10-CM

## 2023-01-27 DIAGNOSIS — R80.9 MICROALBUMINURIA: ICD-10-CM

## 2023-01-27 RX ORDER — LISINOPRIL 5 MG/1
TABLET ORAL
Qty: 90 TABLET | Refills: 3 | Status: SHIPPED | OUTPATIENT
Start: 2023-01-27 | End: 2023-03-21

## 2023-01-31 DIAGNOSIS — Z76.0 ENCOUNTER FOR MEDICATION REFILL: Primary | ICD-10-CM

## 2023-01-31 DIAGNOSIS — E11.9 TYPE 2 DIABETES MELLITUS WITHOUT COMPLICATION, WITHOUT LONG-TERM CURRENT USE OF INSULIN (H): ICD-10-CM

## 2023-02-01 RX ORDER — DULAGLUTIDE 1.5 MG/.5ML
INJECTION, SOLUTION SUBCUTANEOUS
Qty: 2 ML | Refills: 11 | Status: SHIPPED | OUTPATIENT
Start: 2023-02-01 | End: 2024-01-08

## 2023-03-21 ENCOUNTER — OFFICE VISIT (OUTPATIENT)
Dept: FAMILY MEDICINE | Facility: CLINIC | Age: 48
End: 2023-03-21
Payer: COMMERCIAL

## 2023-03-21 VITALS
RESPIRATION RATE: 16 BRPM | OXYGEN SATURATION: 97 % | BODY MASS INDEX: 35.59 KG/M2 | SYSTOLIC BLOOD PRESSURE: 136 MMHG | TEMPERATURE: 98 F | HEIGHT: 67 IN | DIASTOLIC BLOOD PRESSURE: 74 MMHG | WEIGHT: 226.75 LBS | HEART RATE: 74 BPM

## 2023-03-21 DIAGNOSIS — Z23 NEED FOR VACCINATION: ICD-10-CM

## 2023-03-21 DIAGNOSIS — E11.9 TYPE 2 DIABETES MELLITUS WITHOUT COMPLICATION, WITHOUT LONG-TERM CURRENT USE OF INSULIN (H): Primary | ICD-10-CM

## 2023-03-21 DIAGNOSIS — R42 VERTIGO: ICD-10-CM

## 2023-03-21 DIAGNOSIS — I10 ESSENTIAL HYPERTENSION: ICD-10-CM

## 2023-03-21 LAB — HBA1C MFR BLD: 8.8 % (ref 0–5.6)

## 2023-03-21 PROCEDURE — 83036 HEMOGLOBIN GLYCOSYLATED A1C: CPT | Performed by: FAMILY MEDICINE

## 2023-03-21 PROCEDURE — 36415 COLL VENOUS BLD VENIPUNCTURE: CPT | Performed by: FAMILY MEDICINE

## 2023-03-21 PROCEDURE — 90471 IMMUNIZATION ADMIN: CPT | Performed by: FAMILY MEDICINE

## 2023-03-21 PROCEDURE — 90677 PCV20 VACCINE IM: CPT | Performed by: FAMILY MEDICINE

## 2023-03-21 PROCEDURE — 99214 OFFICE O/P EST MOD 30 MIN: CPT | Mod: 25 | Performed by: FAMILY MEDICINE

## 2023-03-21 RX ORDER — LISINOPRIL 10 MG/1
10 TABLET ORAL DAILY
Qty: 90 TABLET | Refills: 3 | Status: SHIPPED | OUTPATIENT
Start: 2023-03-21 | End: 2024-01-08

## 2023-03-21 NOTE — PROGRESS NOTES
"  Assessment & Plan     Type 2 diabetes mellitus without complication, without long-term current use of insulin (H)    Not at goal.  Resume exercise.  Continue Trulicity, metformin and glipzide.    - Hemoglobin A1c  - Hemoglobin A1c    Vertigo    FMLA form: 1 hour in the morning twice per week.      Essential hypertension    Stable.  Continue lisinopril.    - lisinopril (ZESTRIL) 10 MG tablet  Dispense: 90 tablet; Refill: 3    Need for vaccination    - Pneumococcal 20 Valent Conjugate (Prevnar 20)      Prescription drug management         BMI:   Estimated body mass index is 36.05 kg/m  as calculated from the following:    Height as of this encounter: 1.689 m (5' 6.5\").    Weight as of this encounter: 102.9 kg (226 lb 12 oz).           Saul Ribeiro MD  [facility] [place]    Subjective   Sunshine is a 47 year old, presenting for the following health issues:  Diabetes      History of Present Illness       Diabetes:   He presents for follow up of diabetes.  He is checking home blood glucose a few times a month. He checks blood glucose before meals.  Blood glucose is sometimes over 200 and never under 70. He is aware of hypoglycemia symptoms including shakiness, dizziness and weakness. He is concerned about blood sugar frequently over 200.  He is not experiencing numbness or burning in feet, excessive thirst, blurry vision, weight changes or redness, sores or blisters on feet.         He eats 2-3 servings of fruits and vegetables daily.He exercises with enough effort to increase his heart rate 20 to 29 minutes per day.    He is taking medications regularly.       Fingersticks have been 200-230    He did not have Trulicity while in [facility] in December.    Not exercising.    Vertigo in the mornings.  No nausea.    Current Outpatient Medications   Medication Sig Dispense Refill     atorvastatin (LIPITOR) 20 MG tablet TAKE 1 TABLET BY MOUTH EVERY DAY 90 tablet 3     blood glucose (CONTOUR NEXT TEST) test " strip [BLOOD GLUCOSE TEST (CONTOUR NEXT TEST STRIPS) STRIPS] USE TO CHECK BLOOD SUGAR ONCE DAILY 100 strip 3     generic lancets (MICROLET LANCET) [GENERIC LANCETS (MICROLET LANCET)] USE TO CHECK BLOOD SUGARS ONCE DAILY. 200 each 3     glipiZIDE (GLUCOTROL XL) 10 MG 24 hr tablet Take 2 tablets (20 mg) by mouth daily 180 tablet 3     lisinopril (ZESTRIL) 10 MG tablet Take 1 tablet (10 mg) by mouth daily 90 tablet 3     metFORMIN (GLUCOPHAGE XR) 500 MG 24 hr tablet TAKE 4 TABLETS(2000 MG) BY MOUTH DAILY WITH BREAKFAST 360 tablet 3     TRULICITY 1.5 MG/0.5ML pen ADMINISTER 1.5 MG UNDER THE SKIN EVERY 7 DAYS 2 mL 11         Review of Systems   No fever or cough.      Objective    There were no vitals taken for this visit.  There is no height or weight on file to calculate BMI.  Physical Exam   Heart normal  Lungs normal  Ears normal    A1c 8.8, had been 7.8

## 2023-07-09 ENCOUNTER — HEALTH MAINTENANCE LETTER (OUTPATIENT)
Age: 48
End: 2023-07-09

## 2023-07-25 ENCOUNTER — OFFICE VISIT (OUTPATIENT)
Dept: FAMILY MEDICINE | Facility: CLINIC | Age: 48
End: 2023-07-25
Payer: COMMERCIAL

## 2023-07-25 VITALS
OXYGEN SATURATION: 96 % | TEMPERATURE: 98.2 F | HEART RATE: 65 BPM | RESPIRATION RATE: 16 BRPM | DIASTOLIC BLOOD PRESSURE: 78 MMHG | HEIGHT: 67 IN | BODY MASS INDEX: 35.98 KG/M2 | WEIGHT: 229.25 LBS | SYSTOLIC BLOOD PRESSURE: 130 MMHG

## 2023-07-25 DIAGNOSIS — E78.2 MIXED HYPERLIPIDEMIA: ICD-10-CM

## 2023-07-25 DIAGNOSIS — M25.561 ACUTE PAIN OF RIGHT KNEE: ICD-10-CM

## 2023-07-25 DIAGNOSIS — E11.9 TYPE 2 DIABETES MELLITUS WITHOUT COMPLICATION, WITHOUT LONG-TERM CURRENT USE OF INSULIN (H): Primary | ICD-10-CM

## 2023-07-25 DIAGNOSIS — I10 ESSENTIAL HYPERTENSION: ICD-10-CM

## 2023-07-25 LAB
ALBUMIN SERPL BCG-MCNC: 4.4 G/DL (ref 3.5–5.2)
ALP SERPL-CCNC: 86 U/L (ref 40–129)
ALT SERPL W P-5'-P-CCNC: 80 U/L (ref 0–70)
ANION GAP SERPL CALCULATED.3IONS-SCNC: 11 MMOL/L (ref 7–15)
AST SERPL W P-5'-P-CCNC: 46 U/L (ref 0–45)
BILIRUB SERPL-MCNC: 0.4 MG/DL
BUN SERPL-MCNC: 12 MG/DL (ref 6–20)
CALCIUM SERPL-MCNC: 9.3 MG/DL (ref 8.6–10)
CHLORIDE SERPL-SCNC: 104 MMOL/L (ref 98–107)
CHOLEST SERPL-MCNC: 86 MG/DL
CREAT SERPL-MCNC: 0.9 MG/DL (ref 0.67–1.17)
CREAT UR-MCNC: 153 MG/DL
DEPRECATED HCO3 PLAS-SCNC: 25 MMOL/L (ref 22–29)
GFR SERPL CREATININE-BSD FRML MDRD: >90 ML/MIN/1.73M2
GLUCOSE SERPL-MCNC: 200 MG/DL (ref 70–99)
HBA1C MFR BLD: 7.7 % (ref 0–5.6)
HDLC SERPL-MCNC: 27 MG/DL
HOLD SPECIMEN: NORMAL
HOLD SPECIMEN: NORMAL
LDLC SERPL CALC-MCNC: 38 MG/DL
MICROALBUMIN UR-MCNC: 87.2 MG/L
MICROALBUMIN/CREAT UR: 56.99 MG/G CR (ref 0–17)
NONHDLC SERPL-MCNC: 59 MG/DL
POTASSIUM SERPL-SCNC: 4.2 MMOL/L (ref 3.4–5.3)
PROT SERPL-MCNC: 7.3 G/DL (ref 6.4–8.3)
SODIUM SERPL-SCNC: 140 MMOL/L (ref 136–145)
TRIGL SERPL-MCNC: 104 MG/DL

## 2023-07-25 PROCEDURE — 36415 COLL VENOUS BLD VENIPUNCTURE: CPT | Performed by: FAMILY MEDICINE

## 2023-07-25 PROCEDURE — 99214 OFFICE O/P EST MOD 30 MIN: CPT | Performed by: FAMILY MEDICINE

## 2023-07-25 PROCEDURE — 82043 UR ALBUMIN QUANTITATIVE: CPT | Performed by: FAMILY MEDICINE

## 2023-07-25 PROCEDURE — 80061 LIPID PANEL: CPT | Performed by: FAMILY MEDICINE

## 2023-07-25 PROCEDURE — 82570 ASSAY OF URINE CREATININE: CPT | Performed by: FAMILY MEDICINE

## 2023-07-25 PROCEDURE — 83036 HEMOGLOBIN GLYCOSYLATED A1C: CPT | Performed by: FAMILY MEDICINE

## 2023-07-25 PROCEDURE — 80053 COMPREHEN METABOLIC PANEL: CPT | Performed by: FAMILY MEDICINE

## 2023-07-25 NOTE — PROGRESS NOTES
"  Assessment & Plan     Type 2 diabetes mellitus without complication, without long-term current use of insulin (H)    Improved.    - Albumin Random Urine Quantitative with Creat Ratio  - Hemoglobin A1c  - Hemoglobin A1c  - Albumin Random Urine Quantitative with Creat Ratio    Essential hypertension    Stable.    - Comprehensive metabolic panel (BMP + Alb, Alk Phos, ALT, AST, Total. Bili, TP)  - Comprehensive metabolic panel (BMP + Alb, Alk Phos, ALT, AST, Total. Bili, TP)    Mixed hyperlipidemia    Stable.    - Lipid panel reflex to direct LDL Non-fasting  - Lipid panel reflex to direct LDL Non-fasting    Acute pain of right knee    X 1 month.  Avoid overuse.      Return in 4 months for Px.        Ordering of each unique test         BMI:   Estimated body mass index is 36.45 kg/m  as calculated from the following:    Height as of this encounter: 1.689 m (5' 6.5\").    Weight as of this encounter: 104 kg (229 lb 4 oz).           Saul Ribeiro MD  [employer] [facility] is a 48 year old, presenting for the following health issues:  No chief complaint on file.        7/25/2023     8:13 AM   Additional Questions   Roomed by Anastacia GLOVER     History of Present Illness       Diabetes:   He presents for follow up of diabetes.  He is checking home blood glucose one time daily.   He checks blood glucose before meals.  Blood glucose is never over 200 and never under 70. He is aware of hypoglycemia symptoms including shakiness.    He has no concerns regarding his diabetes at this time.   He is not experiencing numbness or burning in feet, excessive thirst, blurry vision, weight changes or redness, sores or blisters on feet. The patient has not had a diabetic eye exam in the last 12 months.          He eats 2-3 servings of fruits and vegetables daily.He consumes 0 sweetened beverage(s) daily.He exercises with enough effort to increase his heart rate 20 to 29 minutes per day.  He exercises with " "enough effort to increase his heart rate 5 days per week. He is missing 7 dose(s) of medications per week.       Fingersticks 140-180.  Saw [facility] in [place] August 2022.    Fasting.    Has had right knee infrapatellar pain for 1 month.      Current Outpatient Medications   Medication Sig Dispense Refill    atorvastatin (LIPITOR) 20 MG tablet TAKE 1 TABLET BY MOUTH EVERY DAY 90 tablet 3    blood glucose (CONTOUR NEXT TEST) test strip [BLOOD GLUCOSE TEST (CONTOUR NEXT TEST STRIPS) STRIPS] USE TO CHECK BLOOD SUGAR ONCE DAILY 100 strip 3    generic lancets (MICROLET LANCET) [GENERIC LANCETS (MICROLET LANCET)] USE TO CHECK BLOOD SUGARS ONCE DAILY. 200 each 3    glipiZIDE (GLUCOTROL XL) 10 MG 24 hr tablet Take 2 tablets (20 mg) by mouth daily 180 tablet 3    lisinopril (ZESTRIL) 10 MG tablet Take 1 tablet (10 mg) by mouth daily 90 tablet 3    metFORMIN (GLUCOPHAGE XR) 500 MG 24 hr tablet TAKE 4 TABLETS(2000 MG) BY MOUTH DAILY WITH BREAKFAST 360 tablet 3    TRULICITY 1.5 MG/0.5ML pen ADMINISTER 1.5 MG UNDER THE SKIN EVERY 7 DAYS 2 mL 11           Review of Systems   No fever or cough.      Objective    /78   Pulse 65   Temp 98.2  F (36.8  C) (Oral)   Resp 16   Ht 1.689 m (5' 6.5\")   Wt 104 kg (229 lb 4 oz)   SpO2 96%   BMI 36.45 kg/m    Body mass index is 36.45 kg/m .  Physical Exam   Heart normal  Lungs normal  Right knee normal, other than tender infrapatellar tendon    A1c 7.7, had been 8.8                      "

## 2023-08-16 PROBLEM — R07.9 ACUTE CHEST PAIN: Status: RESOLVED | Noted: 2020-05-16 | Resolved: 2023-08-16

## 2023-08-18 ENCOUNTER — OFFICE VISIT (OUTPATIENT)
Dept: FAMILY MEDICINE | Facility: CLINIC | Age: 48
End: 2023-08-18
Payer: COMMERCIAL

## 2023-08-18 VITALS
BODY MASS INDEX: 35.04 KG/M2 | WEIGHT: 223.25 LBS | SYSTOLIC BLOOD PRESSURE: 126 MMHG | HEART RATE: 71 BPM | OXYGEN SATURATION: 97 % | RESPIRATION RATE: 16 BRPM | HEIGHT: 67 IN | TEMPERATURE: 98.2 F | DIASTOLIC BLOOD PRESSURE: 70 MMHG

## 2023-08-18 DIAGNOSIS — M25.561 ACUTE PAIN OF RIGHT KNEE: Primary | ICD-10-CM

## 2023-08-18 PROCEDURE — 99213 OFFICE O/P EST LOW 20 MIN: CPT | Performed by: FAMILY MEDICINE

## 2023-08-18 NOTE — PROGRESS NOTES
"  Assessment & Plan     Acute pain of right knee    Worsening.    - Orthopedic  Referral               BMI:   Estimated body mass index is 35.49 kg/m  as calculated from the following:    Height as of this encounter: 1.689 m (5' 6.5\").    Weight as of this encounter: 101.3 kg (223 lb 4 oz).           Saul Ribeiro MD  [facility]    Subjective   Sunshine is a 48 year old, presenting for the following health issues:  Knee Pain (Right knee pain x1+ month getting worse /)        8/18/2023    10:00 AM   Additional Questions   Roomed by Anastacia GLOVER       Knee Pain    History of Present Illness       Reason for visit:  Knee pain  Symptom onset:  More than a month    He eats 2-3 servings of fruits and vegetables daily.He consumes 1 sweetened beverage(s) daily.He exercises with enough effort to increase his heart rate 30 to 60 minutes per day.  He exercises with enough effort to increase his heart rate 5 days per week.   He is taking medications regularly.       He has had right knee pain since the end of June.  There was no injury that he was aware of.  He has been wearing a knee sleeve.  Pain has been worsening.  Heat has not helped.  IcyHot cream has not helped.    Current Outpatient Medications   Medication Sig Dispense Refill    blood glucose (CONTOUR NEXT TEST) test strip [BLOOD GLUCOSE TEST (CONTOUR NEXT TEST STRIPS) STRIPS] USE TO CHECK BLOOD SUGAR ONCE DAILY 100 strip 3    generic lancets (MICROLET LANCET) [GENERIC LANCETS (MICROLET LANCET)] USE TO CHECK BLOOD SUGARS ONCE DAILY. 200 each 3    glipiZIDE (GLUCOTROL XL) 10 MG 24 hr tablet Take 2 tablets (20 mg) by mouth daily 180 tablet 3    lisinopril (ZESTRIL) 10 MG tablet Take 1 tablet (10 mg) by mouth daily 90 tablet 3    TRULICITY 1.5 MG/0.5ML pen ADMINISTER 1.5 MG UNDER THE SKIN EVERY 7 DAYS 2 mL 11    atorvastatin (LIPITOR) 20 MG tablet TAKE 1 TABLET BY MOUTH EVERY DAY 90 tablet 2    metFORMIN (GLUCOPHAGE XR) 500 MG 24 hr tablet " "TAKE 4 TABLETS(2000 MG) BY MOUTH DAILY WITH BREAKFAST 360 tablet 1           Review of Systems   No fever or cough.      Objective    /70   Pulse 71   Temp 98.2  F (36.8  C) (Temporal)   Resp 16   Ht 1.689 m (5' 6.5\")   Wt 101.3 kg (223 lb 4 oz)   SpO2 97%   BMI 35.49 kg/m    Body mass index is 35.49 kg/m .  Physical Exam   Heart normal  Lungs normal  Right knee with tenderness of the suprapatellar area and the medial aspect of the knee.  No ligament instability, Jocelyn negative.  No effusion, erythema or increased warmth.                      "

## 2023-08-23 DIAGNOSIS — E11.9 TYPE 2 DIABETES MELLITUS WITHOUT COMPLICATION, WITHOUT LONG-TERM CURRENT USE OF INSULIN (H): ICD-10-CM

## 2023-08-23 DIAGNOSIS — Z76.0 ENCOUNTER FOR MEDICATION REFILL: ICD-10-CM

## 2023-08-23 RX ORDER — METFORMIN HCL 500 MG
TABLET, EXTENDED RELEASE 24 HR ORAL
Qty: 360 TABLET | Refills: 1 | Status: SHIPPED | OUTPATIENT
Start: 2023-08-23 | End: 2023-12-11

## 2023-08-23 NOTE — TELEPHONE ENCOUNTER
"Last Written Prescription Date:  6/21/2022  Last Fill Quantity: 360,  # refills: 3   Last office visit provider:  8/18/2023     Requested Prescriptions   Pending Prescriptions Disp Refills    metFORMIN (GLUCOPHAGE XR) 500 MG 24 hr tablet [Pharmacy Med Name: METFORMIN ER 500MG 24HR TABS] 360 tablet 3     Sig: TAKE 4 TABLETS(2000 MG) BY MOUTH DAILY WITH BREAKFAST       Biguanide Agents Passed - 8/23/2023  4:05 PM        Passed - Patient is age 10 or older        Passed - Patient has documented A1c within the specified period of time.     If HgbA1C is 8 or greater, it needs to be on file within the past 3 months.  If less than 8, must be on file within the past 6 months.     Recent Labs   Lab Test 07/25/23  0804   A1C 7.7*             Passed - Patient's CR is NOT>1.4 OR Patient's EGFR is NOT<45 within past 12 mos.     Recent Labs   Lab Test 07/25/23  0833 06/14/22  0744 04/28/21  1021   GFRESTIMATED >90   < > >60   GFRESTBLACK  --   --  >60    < > = values in this interval not displayed.       Recent Labs   Lab Test 07/25/23  0833   CR 0.90             Passed - Patient does NOT have a diagnosis of CHF.        Passed - Medication is active on med list        Passed - Recent (6 mo) or future (30 days) visit within the authorizing provider's specialty     Patient had office visit in the last 6 months or has a visit in the next 30 days with authorizing provider or within the authorizing provider's specialty.  See \"Patient Info\" tab in inbasket, or \"Choose Columns\" in Meds & Orders section of the refill encounter.                 Kianna Padgett RN 08/23/23 4:07 PM  "

## 2023-09-12 DIAGNOSIS — Z76.0 ENCOUNTER FOR MEDICATION REFILL: ICD-10-CM

## 2023-09-12 DIAGNOSIS — E11.9 TYPE 2 DIABETES MELLITUS WITHOUT COMPLICATION, WITHOUT LONG-TERM CURRENT USE OF INSULIN (H): ICD-10-CM

## 2023-09-12 DIAGNOSIS — E78.5 DYSLIPIDEMIA: ICD-10-CM

## 2023-09-12 RX ORDER — ATORVASTATIN CALCIUM 20 MG/1
TABLET, FILM COATED ORAL
Qty: 90 TABLET | Refills: 2 | Status: SHIPPED | OUTPATIENT
Start: 2023-09-12 | End: 2024-06-10

## 2023-09-19 DIAGNOSIS — E11.9 TYPE 2 DIABETES MELLITUS WITHOUT COMPLICATION, WITHOUT LONG-TERM CURRENT USE OF INSULIN (H): ICD-10-CM

## 2023-09-19 NOTE — TELEPHONE ENCOUNTER
Prescription approved per [facility] Refill Protocol.  Susan Mckinney, RN  [facility] Triage Nurse

## 2023-11-15 DIAGNOSIS — E11.9 TYPE 2 DIABETES MELLITUS WITHOUT COMPLICATION, WITHOUT LONG-TERM CURRENT USE OF INSULIN (H): ICD-10-CM

## 2023-11-15 RX ORDER — GLIPIZIDE 10 MG/1
20 TABLET, FILM COATED, EXTENDED RELEASE ORAL DAILY
Qty: 180 TABLET | Refills: 0 | Status: SHIPPED | OUTPATIENT
Start: 2023-11-15 | End: 2024-02-14

## 2023-12-11 DIAGNOSIS — E11.9 TYPE 2 DIABETES MELLITUS WITHOUT COMPLICATION, WITHOUT LONG-TERM CURRENT USE OF INSULIN (H): ICD-10-CM

## 2023-12-11 DIAGNOSIS — Z76.0 ENCOUNTER FOR MEDICATION REFILL: ICD-10-CM

## 2023-12-11 RX ORDER — METFORMIN HCL 500 MG
TABLET, EXTENDED RELEASE 24 HR ORAL
Qty: 360 TABLET | Refills: 1 | Status: SHIPPED | OUTPATIENT
Start: 2023-12-11 | End: 2024-02-14

## 2024-01-08 ENCOUNTER — OFFICE VISIT (OUTPATIENT)
Dept: FAMILY MEDICINE | Facility: CLINIC | Age: 49
End: 2024-01-08
Attending: FAMILY MEDICINE
Payer: COMMERCIAL

## 2024-01-08 VITALS
BODY MASS INDEX: 36.24 KG/M2 | SYSTOLIC BLOOD PRESSURE: 120 MMHG | HEART RATE: 76 BPM | DIASTOLIC BLOOD PRESSURE: 77 MMHG | TEMPERATURE: 97.9 F | WEIGHT: 225.5 LBS | HEIGHT: 66 IN | RESPIRATION RATE: 16 BRPM | OXYGEN SATURATION: 97 %

## 2024-01-08 DIAGNOSIS — E66.812 CLASS 2 SEVERE OBESITY WITH SERIOUS COMORBIDITY AND BODY MASS INDEX (BMI) OF 36.0 TO 36.9 IN ADULT, UNSPECIFIED OBESITY TYPE (H): ICD-10-CM

## 2024-01-08 DIAGNOSIS — E11.9 TYPE 2 DIABETES MELLITUS WITHOUT COMPLICATION, WITHOUT LONG-TERM CURRENT USE OF INSULIN (H): ICD-10-CM

## 2024-01-08 DIAGNOSIS — I10 ESSENTIAL HYPERTENSION: ICD-10-CM

## 2024-01-08 DIAGNOSIS — Z00.00 ROUTINE GENERAL MEDICAL EXAMINATION AT A HEALTH CARE FACILITY: Primary | ICD-10-CM

## 2024-01-08 DIAGNOSIS — E66.01 CLASS 2 SEVERE OBESITY WITH SERIOUS COMORBIDITY AND BODY MASS INDEX (BMI) OF 36.0 TO 36.9 IN ADULT, UNSPECIFIED OBESITY TYPE (H): ICD-10-CM

## 2024-01-08 LAB
HBA1C MFR BLD: 8.5 % (ref 0–5.6)
HOLD SPECIMEN: NORMAL
HOLD SPECIMEN: NORMAL

## 2024-01-08 PROCEDURE — 99214 OFFICE O/P EST MOD 30 MIN: CPT | Mod: 25 | Performed by: FAMILY MEDICINE

## 2024-01-08 PROCEDURE — 36415 COLL VENOUS BLD VENIPUNCTURE: CPT | Performed by: FAMILY MEDICINE

## 2024-01-08 PROCEDURE — 99396 PREV VISIT EST AGE 40-64: CPT | Performed by: FAMILY MEDICINE

## 2024-01-08 PROCEDURE — 83036 HEMOGLOBIN GLYCOSYLATED A1C: CPT | Performed by: FAMILY MEDICINE

## 2024-01-08 RX ORDER — LISINOPRIL 10 MG/1
10 TABLET ORAL DAILY
Qty: 90 TABLET | Refills: 3 | Status: SHIPPED | OUTPATIENT
Start: 2024-01-08

## 2024-01-08 RX ORDER — DULAGLUTIDE 1.5 MG/.5ML
1.5 INJECTION, SOLUTION SUBCUTANEOUS
Qty: 6 ML | Refills: 3 | Status: SHIPPED | OUTPATIENT
Start: 2024-01-08 | End: 2024-01-22

## 2024-01-08 RX ORDER — PIOGLITAZONEHYDROCHLORIDE 15 MG/1
15 TABLET ORAL DAILY
Qty: 90 TABLET | Refills: 3 | Status: SHIPPED | OUTPATIENT
Start: 2024-01-08

## 2024-01-08 ASSESSMENT — ENCOUNTER SYMPTOMS
FREQUENCY: 0
PALPITATIONS: 0
ARTHRALGIAS: 0
CHILLS: 0
PARESTHESIAS: 0
HEARTBURN: 0
FEVER: 0
NAUSEA: 0
HEADACHES: 0
COUGH: 0
WEAKNESS: 0
HEMATURIA: 0
EYE PAIN: 0
CONSTIPATION: 0
DIARRHEA: 0
JOINT SWELLING: 0
DIZZINESS: 0
NERVOUS/ANXIOUS: 0
MYALGIAS: 0
HEMATOCHEZIA: 0
SHORTNESS OF BREATH: 0
DYSURIA: 0
ABDOMINAL PAIN: 0
SORE THROAT: 0

## 2024-01-08 NOTE — PROGRESS NOTES
"  Assessment & Plan     Routine general medical examination at a health care facility      Type 2 diabetes mellitus without complication, without long-term current use of insulin (H)    Not controlled.    Discussed possible use of Lantus, pt declined.    Begin pioglitazone.    Recheck in 4 months.      - Hemoglobin A1c  - Hemoglobin A1c  - pioglitazone (ACTOS) 15 MG tablet  Dispense: 90 tablet; Refill: 3  - dulaglutide (TRULICITY) 1.5 MG/0.5ML pen  Dispense: 6 mL; Refill: 3  - PRIMARY CARE FOLLOW-UP SCHEDULING  - Continuous Blood Gluc  (FREESTYLE LAWRENCE 2 READER) SIMI  Dispense: 1 each; Refill: 0  - Continuous Blood Gluc Sensor (FREESTYLE LAWRENCE 2 SENSOR) MISC  Dispense: 2 each; Refill: 5    Essential hypertension    Stable.    - lisinopril (ZESTRIL) 10 MG tablet  Dispense: 90 tablet; Refill: 3    Class 2 severe obesity with serious comorbidity and body mass index (BMI) of 36.0 to 36.9 in adult, unspecified obesity type (H)    He is eating carefully and walking.      Prescription drug management         BMI:   Estimated body mass index is 36.9 kg/m  as calculated from the following:    Height as of this encounter: 1.665 m (5' 5.55\").    Weight as of this encounter: 102.3 kg (225 lb 8 oz).   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 a 48 year old, presenting for the following health issues:  Physical      1/8/2024     3:46 PM   Additional Questions   Roomed by Alexandra NARVAEZ       Healthy Habits:     Getting at least 3 servings of Calcium per day:  Yes    Bi-annual eye exam:  Yes    Dental care twice a year:  Yes    Sleep apnea or symptoms of sleep apnea:  None    Diet:  Regular (no restrictions)    Frequency of exercise:  2-3 days/week    Duration of exercise:  15-30 minutes    Taking medications regularly:  Yes    Medication side effects:  None    Additional concerns today:  No             Had " colonoscopy in 2022, repeat in 7 years.    Has been walking.    Weight decreased 4 # since July.    Current Outpatient Medications   Medication Sig Dispense Refill    atorvastatin (LIPITOR) 20 MG tablet TAKE 1 TABLET BY MOUTH EVERY DAY 90 tablet 2    blood glucose (CONTOUR NEXT TEST) test strip CHECK ONCE DAILY 100 strip 0    Continuous Blood Gluc  (FREESTYLE LAWRENCE 2 READER) SIMI Use to read blood sugars as per 's instructions. 1 each 0    Continuous Blood Gluc Sensor (FREESTYLE LAWRENCE 2 SENSOR) MISC Change every 14 days. 2 each 5    dulaglutide (TRULICITY) 1.5 MG/0.5ML pen Inject 1.5 mg Subcutaneous every 7 days 6 mL 3    generic lancets (MICROLET LANCET) [GENERIC LANCETS (MICROLET LANCET)] USE TO CHECK BLOOD SUGARS ONCE DAILY. 200 each 3    glipiZIDE (GLUCOTROL XL) 10 MG 24 hr tablet TAKE 2 TABLETS(20 MG) BY MOUTH DAILY 180 tablet 0    lisinopril (ZESTRIL) 10 MG tablet Take 1 tablet (10 mg) by mouth daily 90 tablet 3    metFORMIN (GLUCOPHAGE XR) 500 MG 24 hr tablet TAKE 4 TABLETS(2000 MG) BY MOUTH DAILY WITH BREAKFAST 360 tablet 1    pioglitazone (ACTOS) 15 MG tablet Take 1 tablet (15 mg) by mouth daily 90 tablet 3         Review of Systems   Constitutional:  Negative for chills and fever.   HENT:  Negative for congestion, ear pain, hearing loss and sore throat.    Eyes:  Negative for pain and visual disturbance.   Respiratory:  Negative for cough and shortness of breath.    Cardiovascular:  Negative for chest pain, palpitations and peripheral edema.   Gastrointestinal:  Negative for abdominal pain, constipation, diarrhea, heartburn, hematochezia and nausea.   Genitourinary:  Negative for dysuria, frequency, genital sores, hematuria, impotence, penile discharge and urgency.   Musculoskeletal:  Negative for arthralgias, joint swelling and myalgias.   Skin:  Negative for rash.   Neurological:  Negative for dizziness, weakness, headaches and paresthesias.   Psychiatric/Behavioral:  Negative for  "mood changes. The patient is not nervous/anxious.             Objective    /77   Pulse 76   Temp 97.9  F (36.6  C) (Oral)   Resp 16   Ht 1.665 m (5' 5.55\")   Wt 102.3 kg (225 lb 8 oz)   SpO2 97%   BMI 36.90 kg/m    Body mass index is 36.9 kg/m .  Physical Exam   Eyes: EOM full, pupils normal, conjunctivae normal  Ears: TM's and canals normal  Oropharynx: normal  Neck: supple without adenopathy or thyromegaly  Lungs: normal  Heart: regular rhythm, normal rate, no murmur  Abdomen: no HSM, mass or tenderness  Pt declined   Extremities: FROM, normal strength and sensation    A1c 8.5, had been 7.7                    "

## 2024-01-13 PROBLEM — E66.01 CLASS 2 SEVERE OBESITY DUE TO EXCESS CALORIES WITH SERIOUS COMORBIDITY IN ADULT (H): Status: ACTIVE | Noted: 2024-01-13

## 2024-01-13 PROBLEM — E66.812 CLASS 2 SEVERE OBESITY DUE TO EXCESS CALORIES WITH SERIOUS COMORBIDITY IN ADULT (H): Status: ACTIVE | Noted: 2024-01-13

## 2024-01-22 ENCOUNTER — MYC MEDICAL ADVICE (OUTPATIENT)
Dept: FAMILY MEDICINE | Facility: CLINIC | Age: 49
End: 2024-01-22
Payer: COMMERCIAL

## 2024-01-22 DIAGNOSIS — E11.9 TYPE 2 DIABETES MELLITUS WITHOUT COMPLICATION, WITHOUT LONG-TERM CURRENT USE OF INSULIN (H): ICD-10-CM

## 2024-01-22 RX ORDER — DULAGLUTIDE 1.5 MG/.5ML
1.5 INJECTION, SOLUTION SUBCUTANEOUS
Qty: 6 ML | Refills: 3 | Status: SHIPPED | OUTPATIENT
Start: 2024-01-22

## 2024-01-22 NOTE — TELEPHONE ENCOUNTER
"Trulicity medication had been resend under \"fulfill order\" to [facility] pharmacy that can order medication and arrive sooner than previous pharmacy.  Patient made aware.      Address below sent to patient via [employer].    [email or web address]  [facility]  [address] [place] [id number]   ~8.7 mi  [phone]  Open   Closes 9 PM  "
Contacted [facility] pharmacy across the street from [facility] and was told medication this dosage is not in stock but can order for deliver the next day.  Patient contacted for an udpate.    PHYLLIS HatfieldN, RN  [facility]      
n/a

## 2024-02-13 DIAGNOSIS — E11.9 TYPE 2 DIABETES MELLITUS WITHOUT COMPLICATION, WITHOUT LONG-TERM CURRENT USE OF INSULIN (H): ICD-10-CM

## 2024-02-13 DIAGNOSIS — Z76.0 ENCOUNTER FOR MEDICATION REFILL: ICD-10-CM

## 2024-02-14 RX ORDER — GLIPIZIDE 10 MG/1
20 TABLET, FILM COATED, EXTENDED RELEASE ORAL DAILY
Qty: 180 TABLET | Refills: 0 | Status: SHIPPED | OUTPATIENT
Start: 2024-02-14 | End: 2024-05-08

## 2024-02-14 RX ORDER — METFORMIN HCL 500 MG
TABLET, EXTENDED RELEASE 24 HR ORAL
Qty: 360 TABLET | Refills: 0 | Status: SHIPPED | OUTPATIENT
Start: 2024-02-14 | End: 2024-05-27

## 2024-02-14 NOTE — TELEPHONE ENCOUNTER
Prescription approved per [facility] Refill Protocol.  Susan Mckinney, RN  [facility] Triage Nurse

## 2024-02-23 ENCOUNTER — MYC MEDICAL ADVICE (OUTPATIENT)
Dept: FAMILY MEDICINE | Facility: CLINIC | Age: 49
End: 2024-02-23

## 2024-03-21 ENCOUNTER — TRANSFERRED RECORDS (OUTPATIENT)
Dept: MULTI SPECIALTY CLINIC | Facility: CLINIC | Age: 49
End: 2024-03-21

## 2024-03-21 LAB — RETINOPATHY: POSITIVE

## 2024-05-08 ENCOUNTER — OFFICE VISIT (OUTPATIENT)
Dept: FAMILY MEDICINE | Facility: CLINIC | Age: 49
End: 2024-05-08
Attending: FAMILY MEDICINE
Payer: COMMERCIAL

## 2024-05-08 VITALS
WEIGHT: 230 LBS | TEMPERATURE: 98.2 F | BODY MASS INDEX: 36.96 KG/M2 | HEART RATE: 70 BPM | HEIGHT: 66 IN | DIASTOLIC BLOOD PRESSURE: 70 MMHG | RESPIRATION RATE: 14 BRPM | SYSTOLIC BLOOD PRESSURE: 124 MMHG | OXYGEN SATURATION: 95 %

## 2024-05-08 DIAGNOSIS — M25.531 RIGHT WRIST PAIN: ICD-10-CM

## 2024-05-08 DIAGNOSIS — E11.9 TYPE 2 DIABETES MELLITUS WITHOUT COMPLICATION, WITHOUT LONG-TERM CURRENT USE OF INSULIN (H): Primary | ICD-10-CM

## 2024-05-08 DIAGNOSIS — L98.9 SKIN LESION: ICD-10-CM

## 2024-05-08 LAB — HBA1C MFR BLD: 7.8 % (ref 0–5.6)

## 2024-05-08 PROCEDURE — 36415 COLL VENOUS BLD VENIPUNCTURE: CPT | Performed by: FAMILY MEDICINE

## 2024-05-08 PROCEDURE — G2211 COMPLEX E/M VISIT ADD ON: HCPCS | Performed by: FAMILY MEDICINE

## 2024-05-08 PROCEDURE — 83036 HEMOGLOBIN GLYCOSYLATED A1C: CPT | Performed by: FAMILY MEDICINE

## 2024-05-08 PROCEDURE — 99214 OFFICE O/P EST MOD 30 MIN: CPT | Performed by: FAMILY MEDICINE

## 2024-05-08 RX ORDER — GLIPIZIDE 10 MG/1
20 TABLET, FILM COATED, EXTENDED RELEASE ORAL DAILY
Qty: 180 TABLET | Refills: 3 | Status: SHIPPED | OUTPATIENT
Start: 2024-05-08

## 2024-05-08 NOTE — PROGRESS NOTES
Assessment & Plan     Type 2 diabetes mellitus without complication, without long-term current use of insulin (H)    Improved.    Recheck in 4 months.      - PRIMARY CARE FOLLOW-UP SCHEDULING  - Hemoglobin A1c  - Hemoglobin A1c  - glipiZIDE (GLUCOTROL XL) 10 MG 24 hr tablet  Dispense: 180 tablet; Refill: 3  - PRIMARY CARE FOLLOW-UP SCHEDULING    Right wrist pain    No synovitis.    Expect gradual improvement.      Skin lesion    No infection.      The longitudinal plan of care for the diagnosis(es)/condition(s) as documented were addressed during this visit. Due to the added complexity in care, I will continue to support Angela in the subsequent management and with ongoing continuity of care.      30 minutes spent by me on the date of the encounter doing chart review, review of test results, and patient visit regarding diabetes, wrist pain, skin lesion.              Alesia Miller is a 48 year old, presenting for the following health issues:  Diabetes    Via the Health Maintenance questionnaire, the patient has reported the following services have been completed , this information has been sent to the abstraction team.  History of Present Illness       Diabetes:   He presents for follow up of diabetes.  He is checking home blood glucose four or more times daily.   He checks blood glucose before and after meals.  Blood glucose is sometimes over 200 and never under 70. He is aware of hypoglycemia symptoms including shakiness.    He has no concerns regarding his diabetes at this time.   He is not experiencing numbness or burning in feet, excessive thirst, blurry vision, weight changes or redness, sores or blisters on feet. The patient has had a diabetic eye exam in the last 12 months. Eye exam performed on 03/21/2024. Location of last eye exam associated eye.                  Fingersticks usually ~140, range 70 - 200.  Low 1-2 times per month.    He eats at noon and 4 pm.    Weight increased 5 # since  "January.    He occasionally skips glipizide.    Current Outpatient Medications   Medication Sig Dispense Refill    atorvastatin (LIPITOR) 20 MG tablet TAKE 1 TABLET BY MOUTH EVERY DAY 90 tablet 2    blood glucose (CONTOUR NEXT TEST) test strip CHECK ONCE DAILY 100 strip 0    Continuous Blood Gluc  (FREESTYLE LAWRENCE 2 READER) SIMI Use to read blood sugars as per 's instructions. 1 each 0    Continuous Blood Gluc Sensor (FREESTYLE LAWRENCE 2 SENSOR) MISC Change every 14 days. 2 each 5    dulaglutide (TRULICITY) 1.5 MG/0.5ML pen Inject 1.5 mg Subcutaneous every 7 days 6 mL 3    generic lancets (MICROLET LANCET) [GENERIC LANCETS (MICROLET LANCET)] USE TO CHECK BLOOD SUGARS ONCE DAILY. 200 each 3    glipiZIDE (GLUCOTROL XL) 10 MG 24 hr tablet Take 2 tablets (20 mg) by mouth daily 180 tablet 3    lisinopril (ZESTRIL) 10 MG tablet Take 1 tablet (10 mg) by mouth daily 90 tablet 3    metFORMIN (GLUCOPHAGE XR) 500 MG 24 hr tablet TAKE 4 TABLETS(2000 MG) BY MOUTH DAILY WITH BREAKFAST 360 tablet 0    pioglitazone (ACTOS) 15 MG tablet Take 1 tablet (15 mg) by mouth daily 90 tablet 3             Objective    /70   Pulse 70   Temp 98.2  F (36.8  C) (Oral)   Resp 14   Ht 1.67 m (5' 5.75\")   Wt 104.3 kg (230 lb)   SpO2 95%   BMI 37.41 kg/m    Body mass index is 37.41 kg/m .  Physical Exam     Heart normal  Lungs normal  Skin irritation behind right ear  Right wrist slightly sore, no synovitis.  FROM.  No injury    A1c 7.8, had been 8.5          Signed Electronically by: Saul Ribeiro MD    "

## 2024-05-26 DIAGNOSIS — E11.9 TYPE 2 DIABETES MELLITUS WITHOUT COMPLICATION, WITHOUT LONG-TERM CURRENT USE OF INSULIN (H): ICD-10-CM

## 2024-05-26 DIAGNOSIS — Z76.0 ENCOUNTER FOR MEDICATION REFILL: ICD-10-CM

## 2024-05-27 RX ORDER — METFORMIN HCL 500 MG
TABLET, EXTENDED RELEASE 24 HR ORAL
Qty: 360 TABLET | Refills: 0 | Status: SHIPPED | OUTPATIENT
Start: 2024-05-27

## 2024-06-08 DIAGNOSIS — E11.9 TYPE 2 DIABETES MELLITUS WITHOUT COMPLICATION, WITHOUT LONG-TERM CURRENT USE OF INSULIN (H): ICD-10-CM

## 2024-06-08 DIAGNOSIS — E78.5 DYSLIPIDEMIA: ICD-10-CM

## 2024-06-08 DIAGNOSIS — Z76.0 ENCOUNTER FOR MEDICATION REFILL: ICD-10-CM

## 2024-06-10 RX ORDER — ATORVASTATIN CALCIUM 20 MG/1
TABLET, FILM COATED ORAL
Qty: 90 TABLET | Refills: 2 | Status: SHIPPED | OUTPATIENT
Start: 2024-06-10

## 2024-07-07 DIAGNOSIS — E11.9 TYPE 2 DIABETES MELLITUS WITHOUT COMPLICATION, WITHOUT LONG-TERM CURRENT USE OF INSULIN (H): ICD-10-CM

## 2024-09-10 ENCOUNTER — OFFICE VISIT (OUTPATIENT)
Dept: FAMILY MEDICINE | Facility: CLINIC | Age: 49
End: 2024-09-10
Attending: FAMILY MEDICINE
Payer: COMMERCIAL

## 2024-09-10 VITALS
RESPIRATION RATE: 16 BRPM | SYSTOLIC BLOOD PRESSURE: 121 MMHG | OXYGEN SATURATION: 97 % | TEMPERATURE: 97.3 F | DIASTOLIC BLOOD PRESSURE: 77 MMHG | BODY MASS INDEX: 37.13 KG/M2 | WEIGHT: 231.04 LBS | HEART RATE: 67 BPM | HEIGHT: 66 IN

## 2024-09-10 DIAGNOSIS — I10 ESSENTIAL HYPERTENSION: ICD-10-CM

## 2024-09-10 DIAGNOSIS — E78.2 MIXED HYPERLIPIDEMIA: ICD-10-CM

## 2024-09-10 DIAGNOSIS — E11.9 TYPE 2 DIABETES MELLITUS WITHOUT COMPLICATION, WITHOUT LONG-TERM CURRENT USE OF INSULIN (H): Primary | ICD-10-CM

## 2024-09-10 LAB
ALBUMIN SERPL BCG-MCNC: 4.5 G/DL (ref 3.5–5.2)
ALP SERPL-CCNC: 74 U/L (ref 40–150)
ALT SERPL W P-5'-P-CCNC: 64 U/L (ref 0–70)
ANION GAP SERPL CALCULATED.3IONS-SCNC: 9 MMOL/L (ref 7–15)
AST SERPL W P-5'-P-CCNC: 31 U/L (ref 0–45)
BILIRUB SERPL-MCNC: 0.6 MG/DL
BUN SERPL-MCNC: 14.4 MG/DL (ref 6–20)
CALCIUM SERPL-MCNC: 8.9 MG/DL (ref 8.8–10.4)
CHLORIDE SERPL-SCNC: 103 MMOL/L (ref 98–107)
CHOLEST SERPL-MCNC: 100 MG/DL
CREAT SERPL-MCNC: 0.88 MG/DL (ref 0.67–1.17)
CREAT UR-MCNC: 112 MG/DL
EGFRCR SERPLBLD CKD-EPI 2021: >90 ML/MIN/1.73M2
FASTING STATUS PATIENT QL REPORTED: YES
FASTING STATUS PATIENT QL REPORTED: YES
GLUCOSE SERPL-MCNC: 178 MG/DL (ref 70–99)
HBA1C MFR BLD: 8 % (ref 0–5.6)
HCO3 SERPL-SCNC: 27 MMOL/L (ref 22–29)
HDLC SERPL-MCNC: 28 MG/DL
LDLC SERPL CALC-MCNC: 51 MG/DL
MICROALBUMIN UR-MCNC: 53 MG/L
MICROALBUMIN/CREAT UR: 47.32 MG/G CR (ref 0–17)
NONHDLC SERPL-MCNC: 72 MG/DL
POTASSIUM SERPL-SCNC: 3.9 MMOL/L (ref 3.4–5.3)
PROT SERPL-MCNC: 7.3 G/DL (ref 6.4–8.3)
SODIUM SERPL-SCNC: 139 MMOL/L (ref 135–145)
TRIGL SERPL-MCNC: 105 MG/DL

## 2024-09-10 PROCEDURE — 83036 HEMOGLOBIN GLYCOSYLATED A1C: CPT | Performed by: FAMILY MEDICINE

## 2024-09-10 PROCEDURE — 82043 UR ALBUMIN QUANTITATIVE: CPT | Performed by: FAMILY MEDICINE

## 2024-09-10 PROCEDURE — G2211 COMPLEX E/M VISIT ADD ON: HCPCS | Performed by: FAMILY MEDICINE

## 2024-09-10 PROCEDURE — 80061 LIPID PANEL: CPT | Performed by: FAMILY MEDICINE

## 2024-09-10 PROCEDURE — 82570 ASSAY OF URINE CREATININE: CPT | Performed by: FAMILY MEDICINE

## 2024-09-10 PROCEDURE — 36415 COLL VENOUS BLD VENIPUNCTURE: CPT | Performed by: FAMILY MEDICINE

## 2024-09-10 PROCEDURE — 80053 COMPREHEN METABOLIC PANEL: CPT | Performed by: FAMILY MEDICINE

## 2024-09-10 PROCEDURE — 99214 OFFICE O/P EST MOD 30 MIN: CPT | Performed by: FAMILY MEDICINE

## 2024-09-10 NOTE — PROGRESS NOTES
"  Assessment & Plan     Type 2 diabetes mellitus without complication, without long-term current use of insulin (H)    Stable.    Recheck in 4 months.      - PRIMARY CARE FOLLOW-UP SCHEDULING  - Albumin Random Urine Quantitative with Creat Ratio  - Hemoglobin A1c  - PRIMARY CARE FOLLOW-UP SCHEDULING  - Hemoglobin A1c  - Albumin Random Urine Quantitative with Creat Ratio    Essential hypertension    Stable.    - Comprehensive metabolic panel (BMP + Alb, Alk Phos, ALT, AST, Total. Bili, TP)  - Comprehensive metabolic panel (BMP + Alb, Alk Phos, ALT, AST, Total. Bili, TP)    Mixed hyperlipidemia    Stable.    - Lipid panel reflex to direct LDL Fasting  - Lipid panel reflex to direct LDL Fasting      The longitudinal plan of care for the diagnosis(es)/condition(s) as documented were addressed during this visit. Due to the added complexity in care, I will continue to support Angela in the subsequent management and with ongoing continuity of care.      Ordering of each unique test        BMI  Estimated body mass index is 37.57 kg/m  as calculated from the following:    Height as of this encounter: 1.67 m (5' 5.75\").    Weight as of this encounter: 104.8 kg (231 lb 0.6 oz).             Alesia Miller is a 49 year old, presenting for the following health issues:  Diabetes        9/10/2024     7:47 AM   Additional Questions   Roomed by HILL DIAZ MA   Accompanied by SELF     History of Present Illness       Diabetes:   He presents for follow up of diabetes.  He is checking home blood glucose four or more times daily.   He checks blood glucose before meals, after meals and before and after meals.  Blood glucose is sometimes over 200 and sometimes under 70. He is aware of hypoglycemia symptoms including shakiness and weakness.   He is concerned about blood sugar frequently over 200.    He is not experiencing numbness or burning in feet, excessive thirst, blurry vision, weight changes or redness, sores or blisters on feet. " "                    Glucose 140-160 in mornings.  70 twice.    Fasting.    Weight increased 1 # since last visit.    Current Outpatient Medications   Medication Sig Dispense Refill    atorvastatin (LIPITOR) 20 MG tablet TAKE 1 TABLET BY MOUTH EVERY DAY 90 tablet 2    blood glucose (CONTOUR NEXT TEST) test strip CHECK ONCE DAILY 100 strip 0    Continuous Blood Gluc  (FREESTYLE LAWRENCE 2 READER) SIMI Use to read blood sugars as per 's instructions. 1 each 0    Continuous Glucose Sensor (FREESTYLE LAWRENCE 2 SENSOR) MISC CHANGE EVERY 14 DAYS 2 each 5    dulaglutide (TRULICITY) 1.5 MG/0.5ML pen Inject 1.5 mg Subcutaneous every 7 days 6 mL 3    generic lancets (MICROLET LANCET) [GENERIC LANCETS (MICROLET LANCET)] USE TO CHECK BLOOD SUGARS ONCE DAILY. 200 each 3    glipiZIDE (GLUCOTROL XL) 10 MG 24 hr tablet Take 2 tablets (20 mg) by mouth daily 180 tablet 3    lisinopril (ZESTRIL) 10 MG tablet Take 1 tablet (10 mg) by mouth daily 90 tablet 3    metFORMIN (GLUCOPHAGE XR) 500 MG 24 hr tablet TAKE 4 TABLETS(2000 MG) BY MOUTH DAILY WITH BREAKFAST 360 tablet 0    pioglitazone (ACTOS) 15 MG tablet Take 1 tablet (15 mg) by mouth daily 90 tablet 3           Objective    /77   Pulse 67   Temp 97.3  F (36.3  C) (Temporal)   Resp 16   Ht 1.67 m (5' 5.75\")   Wt 104.8 kg (231 lb 0.6 oz)   SpO2 97%   BMI 37.57 kg/m    Body mass index is 37.57 kg/m .  Physical Exam     Heart normal  Lungs normal          Signed Electronically by: Saul Ribeiro MD    "

## 2024-11-15 DIAGNOSIS — E11.9 TYPE 2 DIABETES MELLITUS WITHOUT COMPLICATION, WITHOUT LONG-TERM CURRENT USE OF INSULIN (H): ICD-10-CM

## 2024-11-15 DIAGNOSIS — Z76.0 ENCOUNTER FOR MEDICATION REFILL: ICD-10-CM

## 2024-11-15 RX ORDER — METFORMIN HYDROCHLORIDE 500 MG/1
TABLET, EXTENDED RELEASE ORAL
Qty: 360 TABLET | Refills: 0 | Status: SHIPPED | OUTPATIENT
Start: 2024-11-15

## 2024-11-21 DIAGNOSIS — E11.9 TYPE 2 DIABETES MELLITUS WITHOUT COMPLICATION, WITHOUT LONG-TERM CURRENT USE OF INSULIN (H): ICD-10-CM

## 2024-11-21 DIAGNOSIS — I10 ESSENTIAL HYPERTENSION: ICD-10-CM

## 2024-11-21 RX ORDER — LISINOPRIL 10 MG/1
10 TABLET ORAL DAILY
Qty: 90 TABLET | Refills: 3 | OUTPATIENT
Start: 2024-11-21

## 2024-11-21 RX ORDER — PIOGLITAZONE 15 MG/1
15 TABLET ORAL DAILY
Qty: 90 TABLET | Refills: 3 | OUTPATIENT
Start: 2024-11-21

## 2024-12-09 ENCOUNTER — PATIENT OUTREACH (OUTPATIENT)
Dept: CARE COORDINATION | Facility: CLINIC | Age: 49
End: 2024-12-09
Payer: COMMERCIAL

## 2024-12-20 DIAGNOSIS — E11.9 TYPE 2 DIABETES MELLITUS WITHOUT COMPLICATION, WITHOUT LONG-TERM CURRENT USE OF INSULIN (H): ICD-10-CM

## 2024-12-20 DIAGNOSIS — I10 ESSENTIAL HYPERTENSION: ICD-10-CM

## 2024-12-20 RX ORDER — LISINOPRIL 10 MG/1
10 TABLET ORAL DAILY
Qty: 90 TABLET | Refills: 2 | Status: SHIPPED | OUTPATIENT
Start: 2024-12-20

## 2024-12-23 ENCOUNTER — PATIENT OUTREACH (OUTPATIENT)
Dept: CARE COORDINATION | Facility: CLINIC | Age: 49
End: 2024-12-23
Payer: COMMERCIAL

## 2025-01-07 RX ORDER — PIOGLITAZONE 15 MG/1
15 TABLET ORAL DAILY
Qty: 90 TABLET | Refills: 3 | Status: SHIPPED | OUTPATIENT
Start: 2025-01-07

## 2025-01-15 ENCOUNTER — TELEPHONE (OUTPATIENT)
Dept: FAMILY MEDICINE | Facility: CLINIC | Age: 50
End: 2025-01-15
Payer: COMMERCIAL

## 2025-01-15 NOTE — TELEPHONE ENCOUNTER
Patient Quality Outreach    Patient is due for the following:   Diabetes -  A1C    Action(s) Taken:   Schedule a office visit for DM     Type of outreach:    Sent [employer] message.    Questions for provider review:    None           Carole Montoya MA

## 2025-02-13 DIAGNOSIS — E11.9 TYPE 2 DIABETES MELLITUS WITHOUT COMPLICATION, WITHOUT LONG-TERM CURRENT USE OF INSULIN (H): ICD-10-CM

## 2025-02-13 DIAGNOSIS — Z76.0 ENCOUNTER FOR MEDICATION REFILL: ICD-10-CM

## 2025-02-13 DIAGNOSIS — E78.5 DYSLIPIDEMIA: ICD-10-CM

## 2025-02-14 RX ORDER — ATORVASTATIN CALCIUM 20 MG/1
TABLET, FILM COATED ORAL
Qty: 90 TABLET | Refills: 2 | Status: SHIPPED | OUTPATIENT
Start: 2025-02-14

## 2025-02-15 RX ORDER — METFORMIN HYDROCHLORIDE 500 MG/1
TABLET, EXTENDED RELEASE ORAL
Qty: 360 TABLET | Refills: 0 | Status: SHIPPED | OUTPATIENT
Start: 2025-02-15

## 2025-03-02 ENCOUNTER — HEALTH MAINTENANCE LETTER (OUTPATIENT)
Age: 50
End: 2025-03-02

## 2025-03-29 ENCOUNTER — HEALTH MAINTENANCE LETTER (OUTPATIENT)
Age: 50
End: 2025-03-29

## 2025-07-07 ENCOUNTER — PATIENT OUTREACH (OUTPATIENT)
Dept: CARE COORDINATION | Facility: CLINIC | Age: 50
End: 2025-07-07
Payer: COMMERCIAL